# Patient Record
Sex: FEMALE | Race: WHITE
[De-identification: names, ages, dates, MRNs, and addresses within clinical notes are randomized per-mention and may not be internally consistent; named-entity substitution may affect disease eponyms.]

---

## 2017-03-19 ENCOUNTER — HOSPITAL ENCOUNTER (EMERGENCY)
Dept: HOSPITAL 84 - D.ER | Age: 77
Discharge: HOME | End: 2017-03-19
Payer: MEDICARE

## 2017-03-19 DIAGNOSIS — I10: ICD-10-CM

## 2017-03-19 DIAGNOSIS — F17.200: ICD-10-CM

## 2017-03-19 DIAGNOSIS — J20.9: Primary | ICD-10-CM

## 2017-03-19 LAB
ALBUMIN SERPL-MCNC: 3.9 G/DL (ref 3.4–5)
ALP SERPL-CCNC: 59 U/L (ref 46–116)
ALT SERPL-CCNC: 15 U/L (ref 10–68)
ANION GAP SERPL CALC-SCNC: 13.6 MMOL/L (ref 8–16)
BASOPHILS NFR BLD AUTO: 0.4 % (ref 0–2)
BILIRUB SERPL-MCNC: 0.33 MG/DL (ref 0.2–1.3)
BUN SERPL-MCNC: 20 MG/DL (ref 7–18)
CALCIUM SERPL-MCNC: 9 MG/DL (ref 8.5–10.1)
CHLORIDE SERPL-SCNC: 104 MMOL/L (ref 98–107)
CHOLEST/HDLC SERPL: 3.3 RATIO (ref 2.3–4.1)
CK MB SERPL-MCNC: 1.2 U/L (ref 0–3.6)
CK SERPL-CCNC: 64 UL (ref 21–215)
CO2 SERPL-SCNC: 26.9 MMOL/L (ref 21–32)
CREAT SERPL-MCNC: 0.9 MG/DL (ref 0.6–1.3)
EOSINOPHIL NFR BLD: 2.8 % (ref 0–7)
ERYTHROCYTE [DISTWIDTH] IN BLOOD BY AUTOMATED COUNT: 14.2 % (ref 11.5–14.5)
GLOBULIN SER-MCNC: 3.3 G/L
GLUCOSE SERPL-MCNC: 105 MG/DL (ref 74–106)
HCT VFR BLD CALC: 38.1 % (ref 36–48)
HDLC SERPL-MCNC: 63 MG/DL (ref 32–96)
HGB BLD-MCNC: 13.3 G/DL (ref 12–16)
IMM GRANULOCYTES NFR BLD: 0.3 % (ref 0–5)
LDL-HDL RATIO: 1.5 RATIO (ref 1.5–3.5)
LDLC SERPL-MCNC: 95 MG/DL (ref 0–100)
LYMPHOCYTES NFR BLD AUTO: 28.5 % (ref 15–50)
MCH RBC QN AUTO: 31 PG (ref 26–34)
MCHC RBC AUTO-ENTMCNC: 34.9 G/DL (ref 31–37)
MCV RBC: 88.8 FL (ref 80–100)
MONOCYTES NFR BLD: 12.4 % (ref 2–11)
NEUTROPHILS NFR BLD AUTO: 55.6 % (ref 40–80)
OSMOLALITY SERPL CALC.SUM OF ELEC: 283 MOSM/KG (ref 275–300)
PLATELET # BLD: 258 10X3/UL (ref 130–400)
PMV BLD AUTO: 10 FL (ref 7.4–10.4)
POTASSIUM SERPL-SCNC: 3.5 MMOL/L (ref 3.5–5.1)
PROT SERPL-MCNC: 7.2 G/DL (ref 6.4–8.2)
RBC # BLD AUTO: 4.29 10X6/UL (ref 4–5.4)
SODIUM SERPL-SCNC: 141 MMOL/L (ref 136–145)
TRIGL SERPL-MCNC: 240 MG/DL (ref 30–200)
TROPONIN I SERPL-MCNC: < 0.017 NG/ML (ref 0–0.06)
WBC # BLD AUTO: 7.2 10X3/UL (ref 4.8–10.8)

## 2017-03-28 ENCOUNTER — HOSPITAL ENCOUNTER (INPATIENT)
Dept: HOSPITAL 84 - D.M2 | Age: 77
LOS: 5 days | Discharge: HOME | DRG: 372 | End: 2017-04-02
Attending: FAMILY MEDICINE | Admitting: FAMILY MEDICINE
Payer: MEDICARE

## 2017-03-28 VITALS
WEIGHT: 109.63 LBS | BODY MASS INDEX: 21.52 KG/M2 | HEIGHT: 60 IN | BODY MASS INDEX: 21.52 KG/M2 | WEIGHT: 109.63 LBS | BODY MASS INDEX: 21.52 KG/M2 | HEIGHT: 60 IN

## 2017-03-28 VITALS — DIASTOLIC BLOOD PRESSURE: 81 MMHG | SYSTOLIC BLOOD PRESSURE: 155 MMHG

## 2017-03-28 VITALS — DIASTOLIC BLOOD PRESSURE: 85 MMHG | SYSTOLIC BLOOD PRESSURE: 139 MMHG

## 2017-03-28 DIAGNOSIS — N39.0: ICD-10-CM

## 2017-03-28 DIAGNOSIS — E86.0: ICD-10-CM

## 2017-03-28 DIAGNOSIS — K58.9: ICD-10-CM

## 2017-03-28 DIAGNOSIS — A04.7: Primary | ICD-10-CM

## 2017-03-28 LAB
ALBUMIN SERPL-MCNC: 4 G/DL (ref 3.4–5)
ALP SERPL-CCNC: 60 U/L (ref 46–116)
ALT SERPL-CCNC: 14 U/L (ref 10–68)
ANION GAP SERPL CALC-SCNC: 17.1 MMOL/L (ref 8–16)
BASOPHILS NFR BLD AUTO: 0.2 % (ref 0–2)
BILIRUB SERPL-MCNC: 0.23 MG/DL (ref 0.2–1.3)
BUN SERPL-MCNC: 19 MG/DL (ref 7–18)
CALCIUM SERPL-MCNC: 9.2 MG/DL (ref 8.5–10.1)
CHLORIDE SERPL-SCNC: 103 MMOL/L (ref 98–107)
CO2 SERPL-SCNC: 24.8 MMOL/L (ref 21–32)
CREAT SERPL-MCNC: 1.1 MG/DL (ref 0.6–1.3)
EOSINOPHIL NFR BLD: 1.7 % (ref 0–7)
ERYTHROCYTE [DISTWIDTH] IN BLOOD BY AUTOMATED COUNT: 13.5 % (ref 11.5–14.5)
GLOBULIN SER-MCNC: 2.8 G/L
GLUCOSE SERPL-MCNC: 102 MG/DL (ref 74–106)
HCT VFR BLD CALC: 37 % (ref 36–48)
HGB BLD-MCNC: 12.1 G/DL (ref 12–16)
IMM GRANULOCYTES NFR BLD: 0.2 % (ref 0–5)
LYMPHOCYTES NFR BLD AUTO: 20.8 % (ref 15–50)
MCH RBC QN AUTO: 29.4 PG (ref 26–34)
MCHC RBC AUTO-ENTMCNC: 32.7 G/DL (ref 31–37)
MCV RBC: 90 FL (ref 80–100)
MONOCYTES NFR BLD: 11.9 % (ref 2–11)
NEUTROPHILS NFR BLD AUTO: 65.2 % (ref 40–80)
OSMOLALITY SERPL CALC.SUM OF ELEC: 282 MOSM/KG (ref 275–300)
PLATELET # BLD: 244 10X3/UL (ref 130–400)
PMV BLD AUTO: 10.5 FL (ref 7.4–10.4)
POTASSIUM SERPL-SCNC: 3.9 MMOL/L (ref 3.5–5.1)
PROT SERPL-MCNC: 6.8 G/DL (ref 6.4–8.2)
RBC # BLD AUTO: 4.11 10X6/UL (ref 4–5.4)
SODIUM SERPL-SCNC: 141 MMOL/L (ref 136–145)
WBC # BLD AUTO: 10 10X3/UL (ref 4.8–10.8)

## 2017-03-28 NOTE — NUR
RECEIVED PATIENT TO ROOM 2105 VIA WHEELCHAIR FROM ADMISSIONS. PATIENT IS A
DIRECT ADMIT FROM 'S OFFICE WITH COMPLAINTS OF BLADDER INFECTION.
ALERT/ORIENTED. SITTING TO SIDE OF BED AT THIS TIME. NO IV ACCESS. RESP EVEN
AND UNLABORED. NO DISTRESS.

## 2017-03-28 NOTE — NUR
ORDERED ROCEPHIN HELD R/T LISTED ALLERGY TO CEFDINIR. PT CANNOT RECALL THAT
SHE IS ALLERGIC TO CEFDINIR OR WHAT HAPPENS WHEN SHE TAKES IT. CONTINUE TO
MONITOR CLOSELY.

## 2017-03-28 NOTE — NUR
AWAITING BLOOD CULTURES TO BE DRAWN, THEN WILL START THE FLAGYL. PT DID TAKE
PRN TYLENOL AND PYRIDIUM. CONTINUE TO MONITOR CLOSELY.

## 2017-03-28 NOTE — NUR
22 GAUGE IV PLACED X 1 STICK TO LEFT FOREARM BY THIS WRITER. GOOD BLOOD
RETURN, EASY FLUSH. ABLE TO PULL NEEDED BLOOD FROM IV FOR PHELBOTOMIST FOR
STAT LABS. TAPED, DATED AND SECURED. PATIENT TOLERATED IV PLACEMENT WELL. NO
DISTRESS. XRAY AT BEDSIDE FOR STAT RADIOGRAPHS AT THIS TIME.

## 2017-03-28 NOTE — NUR
PT AWAKE, ALERT, ORIENTED, REQUESTING PAIN MEDICATION FOR BACK PAIN AND LOWER
ABDOMINAL PAIN. CURRENTLY THERE ARE NO MEDICATION ORDERS. WILL CONTACT ON CALL
PHYSICIAN. WILL ADMINISTER IV ROCEPHIN AS ORDERED. CONTINUE TO MONITOR
CLOSELY.

## 2017-03-28 NOTE — NUR
DR. VIGIL ON CALL DID AUTHORIZE NEWS MEDS AND LAB ORDERS. ORDERS ENTERED.
CONTINUE TO MONITOR PT CLOSELY.

## 2017-03-29 VITALS — DIASTOLIC BLOOD PRESSURE: 68 MMHG | SYSTOLIC BLOOD PRESSURE: 119 MMHG

## 2017-03-29 VITALS — DIASTOLIC BLOOD PRESSURE: 80 MMHG | SYSTOLIC BLOOD PRESSURE: 166 MMHG

## 2017-03-29 VITALS — DIASTOLIC BLOOD PRESSURE: 71 MMHG | SYSTOLIC BLOOD PRESSURE: 145 MMHG

## 2017-03-29 VITALS — DIASTOLIC BLOOD PRESSURE: 76 MMHG | SYSTOLIC BLOOD PRESSURE: 115 MMHG

## 2017-03-29 VITALS — SYSTOLIC BLOOD PRESSURE: 140 MMHG | DIASTOLIC BLOOD PRESSURE: 80 MMHG

## 2017-03-29 VITALS — DIASTOLIC BLOOD PRESSURE: 81 MMHG | SYSTOLIC BLOOD PRESSURE: 136 MMHG

## 2017-03-29 LAB
ALBUMIN SERPL-MCNC: 3.4 G/DL (ref 3.4–5)
ALP SERPL-CCNC: 54 U/L (ref 46–116)
ALT SERPL-CCNC: 14 U/L (ref 10–68)
ANION GAP SERPL CALC-SCNC: 12.5 MMOL/L (ref 8–16)
APPEARANCE UR: CLEAR
BACTERIA #/AREA URNS HPF: (no result) /HPF
BASOPHILS NFR BLD AUTO: 0.2 % (ref 0–2)
BILIRUB SERPL-MCNC: 0.34 MG/DL (ref 0.2–1.3)
BUN SERPL-MCNC: 15 MG/DL (ref 7–18)
CALCIUM SERPL-MCNC: 8.3 MG/DL (ref 8.5–10.1)
CHLORIDE SERPL-SCNC: 106 MMOL/L (ref 98–107)
CO2 SERPL-SCNC: 25 MMOL/L (ref 21–32)
COLOR UR: YELLOW
CREAT SERPL-MCNC: 1.1 MG/DL (ref 0.6–1.3)
EOSINOPHIL NFR BLD: 2.1 % (ref 0–7)
ERYTHROCYTE [DISTWIDTH] IN BLOOD BY AUTOMATED COUNT: 13.1 % (ref 11.5–14.5)
GLOBULIN SER-MCNC: 2.8 G/L
GLUCOSE SERPL-MCNC: 98 MG/DL (ref 74–106)
HCT VFR BLD CALC: 35.1 % (ref 36–48)
HGB BLD-MCNC: 11.5 G/DL (ref 12–16)
IMM GRANULOCYTES NFR BLD: 0.3 % (ref 0–5)
LYMPHOCYTES NFR BLD AUTO: 25.1 % (ref 15–50)
MCH RBC QN AUTO: 29.2 PG (ref 26–34)
MCHC RBC AUTO-ENTMCNC: 32.8 G/DL (ref 31–37)
MCV RBC: 89.1 FL (ref 80–100)
MONOCYTES NFR BLD: 12.5 % (ref 2–11)
NEUTROPHILS NFR BLD AUTO: 59.8 % (ref 40–80)
OSMOLALITY SERPL CALC.SUM OF ELEC: 279 MOSM/KG (ref 275–300)
PH UR STRIP: 6 [PH] (ref 5–6)
PLATELET # BLD: 241 10X3/UL (ref 130–400)
PMV BLD AUTO: 10.3 FL (ref 7.4–10.4)
POTASSIUM SERPL-SCNC: 3.5 MMOL/L (ref 3.5–5.1)
PROT SERPL-MCNC: 6.2 G/DL (ref 6.4–8.2)
RBC # BLD AUTO: 3.94 10X6/UL (ref 4–5.4)
RBC #/AREA URNS HPF: (no result) /HPF (ref 0–5)
SODIUM SERPL-SCNC: 140 MMOL/L (ref 136–145)
SP GR UR STRIP: 1.01 (ref 1–1.02)
SQUAMOUS #/AREA URNS HPF: (no result) /HPF (ref 0–5)
WBC # BLD AUTO: 10.1 10X3/UL (ref 4.8–10.8)
WBC #/AREA URNS HPF: (no result) /HPF (ref 0–5)

## 2017-03-29 NOTE — NUR
PT WAS RECEIVED AT THE BEGINNING OF THIS SHIFT AWAKE AND ORIENTED X 3.  NO
VOICED COMPLAINTS OF THIS TIME.  VITAL SIGNS WNL.  LEFT FOREARM WITH NS GOING
PER PUMP AT 75ML'S/HR.  PT IS UP AD KATELIN.  WILL BE MONITORING PT THROUGHOUT
THIS SHIFT AND ASSISTING PRN WITH ADL'S.  CALL LIGHT IS IN REACH.

## 2017-03-29 NOTE — NUR
THIS PT. GOT TYLENOL 650MG AT 2PM FOR ABDOMINAL PAIN AND THEN AN ORDER FOR
HYDROCODONE 5MG WAS GIVEN AT 2:30PM PER DR. WOLF FOR A ONE TIME DOSE.
MONITORING HER FOR ANY ADVERSE REACTIONS.  CALL LIGHT IS IN REACH.

## 2017-03-29 NOTE — NUR
PT VOMITED, GIVEN PRN ZOFRAN. PT DENIES ANY OTHER NEEDS. PT IS IN NO ACUTE
DISTRESS. CONTINUE TO MONITOR CLOSELY.

## 2017-03-30 VITALS — DIASTOLIC BLOOD PRESSURE: 67 MMHG | SYSTOLIC BLOOD PRESSURE: 137 MMHG

## 2017-03-30 VITALS — DIASTOLIC BLOOD PRESSURE: 70 MMHG | SYSTOLIC BLOOD PRESSURE: 133 MMHG

## 2017-03-30 VITALS — DIASTOLIC BLOOD PRESSURE: 68 MMHG | SYSTOLIC BLOOD PRESSURE: 149 MMHG

## 2017-03-30 VITALS — SYSTOLIC BLOOD PRESSURE: 148 MMHG | DIASTOLIC BLOOD PRESSURE: 66 MMHG

## 2017-03-30 VITALS — DIASTOLIC BLOOD PRESSURE: 57 MMHG | SYSTOLIC BLOOD PRESSURE: 103 MMHG

## 2017-03-30 VITALS — DIASTOLIC BLOOD PRESSURE: 69 MMHG | SYSTOLIC BLOOD PRESSURE: 142 MMHG

## 2017-03-30 NOTE — NUR
RESUMED CARE OF PT, PL-ONM-GV-75, PT IS CONTACT ISO-C-DIFF, FAMILY IN ROOM,
DENIES ANY NEEDS, BED IS LOW, SRX2, CALL LIGHT IN REACH, WILL CONTINUE TO
MONITOR

## 2017-03-31 VITALS — SYSTOLIC BLOOD PRESSURE: 137 MMHG | DIASTOLIC BLOOD PRESSURE: 77 MMHG

## 2017-03-31 VITALS — SYSTOLIC BLOOD PRESSURE: 124 MMHG | DIASTOLIC BLOOD PRESSURE: 63 MMHG

## 2017-03-31 VITALS — SYSTOLIC BLOOD PRESSURE: 146 MMHG | DIASTOLIC BLOOD PRESSURE: 67 MMHG

## 2017-03-31 VITALS — DIASTOLIC BLOOD PRESSURE: 67 MMHG | SYSTOLIC BLOOD PRESSURE: 140 MMHG

## 2017-03-31 VITALS — SYSTOLIC BLOOD PRESSURE: 122 MMHG | DIASTOLIC BLOOD PRESSURE: 63 MMHG

## 2017-03-31 LAB
ALBUMIN SERPL-MCNC: 3.4 G/DL (ref 3.4–5)
ALP SERPL-CCNC: 49 U/L (ref 46–116)
ALT SERPL-CCNC: 18 U/L (ref 10–68)
ANION GAP SERPL CALC-SCNC: 12.7 MMOL/L (ref 8–16)
BASOPHILS NFR BLD AUTO: 0.4 % (ref 0–2)
BILIRUB SERPL-MCNC: 0.33 MG/DL (ref 0.2–1.3)
BUN SERPL-MCNC: 7 MG/DL (ref 7–18)
CALCIUM SERPL-MCNC: 7.9 MG/DL (ref 8.5–10.1)
CHLORIDE SERPL-SCNC: 107 MMOL/L (ref 98–107)
CO2 SERPL-SCNC: 25.3 MMOL/L (ref 21–32)
CREAT SERPL-MCNC: 0.8 MG/DL (ref 0.6–1.3)
EOSINOPHIL NFR BLD: 2.1 % (ref 0–7)
ERYTHROCYTE [DISTWIDTH] IN BLOOD BY AUTOMATED COUNT: 13.2 % (ref 11.5–14.5)
GLOBULIN SER-MCNC: 3 G/L
GLUCOSE SERPL-MCNC: 143 MG/DL (ref 74–106)
HCT VFR BLD CALC: 32.9 % (ref 36–48)
HGB BLD-MCNC: 10.7 G/DL (ref 12–16)
IMM GRANULOCYTES NFR BLD: 0.1 % (ref 0–5)
LYMPHOCYTES NFR BLD AUTO: 24.4 % (ref 15–50)
MCH RBC QN AUTO: 29.2 PG (ref 26–34)
MCHC RBC AUTO-ENTMCNC: 32.5 G/DL (ref 31–37)
MCV RBC: 89.6 FL (ref 80–100)
MONOCYTES NFR BLD: 11.4 % (ref 2–11)
NEUTROPHILS NFR BLD AUTO: 61.6 % (ref 40–80)
OSMOLALITY SERPL CALC.SUM OF ELEC: 282 MOSM/KG (ref 275–300)
PLATELET # BLD: 222 10X3/UL (ref 130–400)
PMV BLD AUTO: 10.4 FL (ref 7.4–10.4)
POTASSIUM SERPL-SCNC: 3 MMOL/L (ref 3.5–5.1)
PROT SERPL-MCNC: 6.4 G/DL (ref 6.4–8.2)
RBC # BLD AUTO: 3.67 10X6/UL (ref 4–5.4)
SODIUM SERPL-SCNC: 142 MMOL/L (ref 136–145)
WBC # BLD AUTO: 7.7 10X3/UL (ref 4.8–10.8)

## 2017-03-31 NOTE — NUR
Patient Name: LIZA MEDEL Admission Status: Elective
Accout number: [id number] Admission Date: 2017
: 1940 Admission Diagnosis:URINARY TRACT INFECTION, SITE NOT
SPECIFIED
Attending: HEATHER Current LOS: 3
 
Anticipated DC Date:
Planned Disposition: Home
Primary Insurance: MEDICARE A & B
 
 
Discharge Planning Comments:
* Is the patient Alert and Oriented? Yes 0
* How many steps to enter\exit or inside your home? 1 0
* PCP DR. WOLF 0
* Pharmacy [facility] 0
* Preadmission Environment Home with Family 0
* ADLs Independent 0
* Equipment Nebulizer 0
* Other Equipment [facility] - MEDICAL EQUIPMENT PROVIDER 0
* List name and contact numbers for known caregivers / representatives who
currently or will assist patient after discharge: DEBBIE MEDEL, DAUGHTER,
[phone] 0
* Community resources currently utilized None 0
* Please name any agencies selected above. NONE 0
* Additional services required to return to the preadmission environment? No 0
* Can the patient safely return to the preadmission environment? Yes 0
* Has this patient been hospitalized within the prior 30 days at any hospital?
No 0
 
CM MET WITH PT IN ROOM TO DISCUSS DISCHARGE PLANNING AND NEEDS. PT REPORTS
LIVING AT HOME INDEPENDENTLY WITH HER SPOUSE AND ADULT DAUGHTER. PT HAS A
NEBULIZER PROVIDED BY [employer] (SHE IS NOT FOR SURE). PT HAS NO
OUTSIDE SERVICES ASSISTING IN THE HOME. CM DISCUSSED AVAILABILITY OF HOME
HEALTH, REHAB SERVICES AND MEDICAL EQUIPMENT. PT DENIES DISCHARGE NEEDS,
REPORTS HER DAUGHTER WILL PICK HER UP FOR DISCHARGE HOME. IMPORTANT MESSAGE
FROM MEDICARE PROVIDED AND EXPLAINED.
 
PT PLANS TO DISCHARGE HOME WITH FAMILY, NO ANTICIPATED DISCHARGE NEEDS. CM TO
FOLLOW AND ASSIST AS NEEDED.
 
: Manish Jackson

## 2017-04-01 VITALS — SYSTOLIC BLOOD PRESSURE: 104 MMHG | DIASTOLIC BLOOD PRESSURE: 66 MMHG

## 2017-04-01 VITALS — SYSTOLIC BLOOD PRESSURE: 124 MMHG | DIASTOLIC BLOOD PRESSURE: 60 MMHG

## 2017-04-01 VITALS — DIASTOLIC BLOOD PRESSURE: 98 MMHG | SYSTOLIC BLOOD PRESSURE: 131 MMHG

## 2017-04-01 VITALS — DIASTOLIC BLOOD PRESSURE: 72 MMHG | SYSTOLIC BLOOD PRESSURE: 145 MMHG

## 2017-04-01 VITALS — DIASTOLIC BLOOD PRESSURE: 51 MMHG | SYSTOLIC BLOOD PRESSURE: 102 MMHG

## 2017-04-01 VITALS — DIASTOLIC BLOOD PRESSURE: 55 MMHG | SYSTOLIC BLOOD PRESSURE: 127 MMHG

## 2017-04-01 LAB
ALBUMIN SERPL-MCNC: 3 G/DL (ref 3.4–5)
ALP SERPL-CCNC: 46 U/L (ref 46–116)
ALT SERPL-CCNC: 28 U/L (ref 10–68)
ANION GAP SERPL CALC-SCNC: 12.5 MMOL/L (ref 8–16)
BASOPHILS NFR BLD AUTO: 0.3 % (ref 0–2)
BILIRUB SERPL-MCNC: 0.2 MG/DL (ref 0.2–1.3)
BUN SERPL-MCNC: 8 MG/DL (ref 7–18)
CALCIUM SERPL-MCNC: 8 MG/DL (ref 8.5–10.1)
CHLORIDE SERPL-SCNC: 109 MMOL/L (ref 98–107)
CO2 SERPL-SCNC: 23.7 MMOL/L (ref 21–32)
CREAT SERPL-MCNC: 0.7 MG/DL (ref 0.6–1.3)
EOSINOPHIL NFR BLD: 3.5 % (ref 0–7)
ERYTHROCYTE [DISTWIDTH] IN BLOOD BY AUTOMATED COUNT: 13 % (ref 11.5–14.5)
GLOBULIN SER-MCNC: 2.9 G/L
GLUCOSE SERPL-MCNC: 95 MG/DL (ref 74–106)
HCT VFR BLD CALC: 32 % (ref 36–48)
HGB BLD-MCNC: 10.3 G/DL (ref 12–16)
IMM GRANULOCYTES NFR BLD: 0.1 % (ref 0–5)
LYMPHOCYTES NFR BLD AUTO: 24.3 % (ref 15–50)
MCH RBC QN AUTO: 28.7 PG (ref 26–34)
MCHC RBC AUTO-ENTMCNC: 32.2 G/DL (ref 31–37)
MCV RBC: 89.1 FL (ref 80–100)
MONOCYTES NFR BLD: 13.6 % (ref 2–11)
NEUTROPHILS NFR BLD AUTO: 58.2 % (ref 40–80)
OSMOLALITY SERPL CALC.SUM OF ELEC: 280 MOSM/KG (ref 275–300)
PLATELET # BLD: 211 10X3/UL (ref 130–400)
PMV BLD AUTO: 10.7 FL (ref 7.4–10.4)
POTASSIUM SERPL-SCNC: 3.2 MMOL/L (ref 3.5–5.1)
PROT SERPL-MCNC: 5.9 G/DL (ref 6.4–8.2)
RBC # BLD AUTO: 3.59 10X6/UL (ref 4–5.4)
SODIUM SERPL-SCNC: 142 MMOL/L (ref 136–145)
WBC # BLD AUTO: 7.4 10X3/UL (ref 4.8–10.8)

## 2017-04-01 NOTE — NUR
PT CALLED HER IV WAS LEAKING. LOOKED AT IV AND WAS INFILTRATED. SO REMOVED IV,
CATH INTACT. ATTEMPTED TO PUT IV IN RIGHT FOREARM BUT WAS UNSUCCESSFUL. TOLD
HER WILL SEND SOMEONE ELSE IN.

## 2017-04-01 NOTE — NUR
PT IN BED RESTING WITH EYES CLOSED. REMAINS ON CONTACT ISOLATION. IV TO LEFT
FA WITH NS AT 75CC/HR. NO DISTRESS NOTED. SR UP X 2. C/L IN REACH. WILL
MONITOR.

## 2017-04-01 NOTE — NUR
PT FAMILY MEMBER CAME TO DESK FOUND ANTS IN ROOM ALL ALONG WINDOW SEAL. CALLED
SUPERVISIOR AND TOLD HER. GOING TO MOVE PT TO ROOM 2109 SINCE UNSURE WHEN THEY
WILL COME TO SPRAY FOR THE ANTS.

## 2017-04-02 VITALS — DIASTOLIC BLOOD PRESSURE: 67 MMHG | SYSTOLIC BLOOD PRESSURE: 140 MMHG

## 2017-04-02 VITALS — DIASTOLIC BLOOD PRESSURE: 79 MMHG | SYSTOLIC BLOOD PRESSURE: 173 MMHG

## 2017-04-02 VITALS — DIASTOLIC BLOOD PRESSURE: 81 MMHG | SYSTOLIC BLOOD PRESSURE: 158 MMHG

## 2017-04-02 VITALS — SYSTOLIC BLOOD PRESSURE: 149 MMHG | DIASTOLIC BLOOD PRESSURE: 73 MMHG

## 2017-04-02 LAB
ANION GAP SERPL CALC-SCNC: 14.2 MMOL/L (ref 8–16)
BASOPHILS NFR BLD AUTO: 0.2 % (ref 0–2)
BUN SERPL-MCNC: 6 MG/DL (ref 7–18)
CALCIUM SERPL-MCNC: 8.3 MG/DL (ref 8.5–10.1)
CHLORIDE SERPL-SCNC: 110 MMOL/L (ref 98–107)
CO2 SERPL-SCNC: 23.2 MMOL/L (ref 21–32)
CREAT SERPL-MCNC: 0.7 MG/DL (ref 0.6–1.3)
EOSINOPHIL NFR BLD: 3.2 % (ref 0–7)
ERYTHROCYTE [DISTWIDTH] IN BLOOD BY AUTOMATED COUNT: 13.2 % (ref 11.5–14.5)
GLUCOSE SERPL-MCNC: 98 MG/DL (ref 74–106)
HCT VFR BLD CALC: 34.2 % (ref 36–48)
HGB BLD-MCNC: 11.1 G/DL (ref 12–16)
IMM GRANULOCYTES NFR BLD: 0.1 % (ref 0–5)
LYMPHOCYTES NFR BLD AUTO: 26.7 % (ref 15–50)
MCH RBC QN AUTO: 28.9 PG (ref 26–34)
MCHC RBC AUTO-ENTMCNC: 32.5 G/DL (ref 31–37)
MCV RBC: 89.1 FL (ref 80–100)
MONOCYTES NFR BLD: 13.2 % (ref 2–11)
NEUTROPHILS NFR BLD AUTO: 56.6 % (ref 40–80)
OSMOLALITY SERPL CALC.SUM OF ELEC: 284 MOSM/KG (ref 275–300)
PLATELET # BLD: 227 10X3/UL (ref 130–400)
PMV BLD AUTO: 10.3 FL (ref 7.4–10.4)
POTASSIUM SERPL-SCNC: 3.4 MMOL/L (ref 3.5–5.1)
RBC # BLD AUTO: 3.84 10X6/UL (ref 4–5.4)
SODIUM SERPL-SCNC: 144 MMOL/L (ref 136–145)
WBC # BLD AUTO: 8.8 10X3/UL (ref 4.8–10.8)

## 2017-05-26 ENCOUNTER — HOSPITAL ENCOUNTER (OUTPATIENT)
Dept: HOSPITAL 84 - D.LAB | Age: 77
Discharge: HOME | End: 2017-05-26
Attending: INTERNAL MEDICINE
Payer: MEDICARE

## 2017-05-26 VITALS — BODY MASS INDEX: 20.5 KG/M2

## 2017-05-26 DIAGNOSIS — R63.4: ICD-10-CM

## 2017-05-26 DIAGNOSIS — K59.00: Primary | ICD-10-CM

## 2017-05-26 LAB
ALBUMIN SERPL-MCNC: 4.1 G/DL (ref 3.4–5)
ALP SERPL-CCNC: 78 U/L (ref 46–116)
ALT SERPL-CCNC: 20 U/L (ref 10–68)
ANION GAP SERPL CALC-SCNC: 14.1 MMOL/L (ref 8–16)
BASOPHILS NFR BLD AUTO: 0.2 % (ref 0–2)
BILIRUB SERPL-MCNC: 0.24 MG/DL (ref 0.2–1.3)
BUN SERPL-MCNC: 12 MG/DL (ref 7–18)
CALCIUM SERPL-MCNC: 9.3 MG/DL (ref 8.5–10.1)
CHLORIDE SERPL-SCNC: 101 MMOL/L (ref 98–107)
CO2 SERPL-SCNC: 27 MMOL/L (ref 21–32)
CREAT SERPL-MCNC: 0.8 MG/DL (ref 0.6–1.3)
EOSINOPHIL NFR BLD: 2 % (ref 0–7)
ERYTHROCYTE [DISTWIDTH] IN BLOOD BY AUTOMATED COUNT: 13.2 % (ref 11.5–14.5)
GLOBULIN SER-MCNC: 3.2 G/L
GLUCOSE SERPL-MCNC: 96 MG/DL (ref 74–106)
HCT VFR BLD CALC: 42.2 % (ref 36–48)
HGB BLD-MCNC: 13.8 G/DL (ref 12–16)
IMM GRANULOCYTES NFR BLD: 0.2 % (ref 0–5)
LYMPHOCYTES NFR BLD AUTO: 29 % (ref 15–50)
MCH RBC QN AUTO: 29.9 PG (ref 26–34)
MCHC RBC AUTO-ENTMCNC: 32.7 G/DL (ref 31–37)
MCV RBC: 91.3 FL (ref 80–100)
MONOCYTES NFR BLD: 10.2 % (ref 2–11)
NEUTROPHILS NFR BLD AUTO: 58.4 % (ref 40–80)
OSMOLALITY SERPL CALC.SUM OF ELEC: 275 MOSM/KG (ref 275–300)
PLATELET # BLD: 319 10X3/UL (ref 130–400)
PMV BLD AUTO: 9.9 FL (ref 7.4–10.4)
POTASSIUM SERPL-SCNC: 4.1 MMOL/L (ref 3.5–5.1)
PROT SERPL-MCNC: 7.3 G/DL (ref 6.4–8.2)
RBC # BLD AUTO: 4.62 10X6/UL (ref 4–5.4)
SODIUM SERPL-SCNC: 138 MMOL/L (ref 136–145)
WBC # BLD AUTO: 9.4 10X3/UL (ref 4.8–10.8)

## 2017-08-11 ENCOUNTER — HOSPITAL ENCOUNTER (OUTPATIENT)
Dept: HOSPITAL 84 - D.RAD | Age: 77
Discharge: HOME | End: 2017-08-11
Attending: FAMILY MEDICINE
Payer: MEDICARE

## 2017-08-11 VITALS — BODY MASS INDEX: 20.5 KG/M2

## 2017-08-11 DIAGNOSIS — J44.9: Primary | ICD-10-CM

## 2018-06-01 ENCOUNTER — HOSPITAL ENCOUNTER (EMERGENCY)
Dept: HOSPITAL 84 - D.ER | Age: 78
Discharge: HOME | End: 2018-06-01
Payer: MEDICARE

## 2018-06-01 VITALS — BODY MASS INDEX: 20.5 KG/M2

## 2018-06-01 DIAGNOSIS — I10: ICD-10-CM

## 2018-06-01 DIAGNOSIS — R55: Primary | ICD-10-CM

## 2018-06-01 DIAGNOSIS — R19.7: ICD-10-CM

## 2018-06-01 DIAGNOSIS — R10.9: ICD-10-CM

## 2018-06-01 DIAGNOSIS — Z87.19: ICD-10-CM

## 2018-06-01 LAB
ALBUMIN SERPL-MCNC: 3.6 G/DL (ref 3.4–5)
ALP SERPL-CCNC: 62 U/L (ref 46–116)
ALT SERPL-CCNC: 15 U/L (ref 10–68)
ANION GAP SERPL CALC-SCNC: 15.4 MMOL/L (ref 8–16)
APPEARANCE UR: CLEAR
BACTERIA #/AREA URNS HPF: (no result) /HPF
BASOPHILS NFR BLD AUTO: 0.2 % (ref 0–2)
BILIRUB SERPL-MCNC: 0.31 MG/DL (ref 0.2–1.3)
BILIRUB SERPL-MCNC: NEGATIVE MG/DL
BUN SERPL-MCNC: 13 MG/DL (ref 7–18)
CALCIUM SERPL-MCNC: 9 MG/DL (ref 8.5–10.1)
CHLORIDE SERPL-SCNC: 100 MMOL/L (ref 98–107)
CK SERPL-CCNC: 57 UL (ref 21–215)
CO2 SERPL-SCNC: 23.4 MMOL/L (ref 21–32)
COLOR UR: YELLOW
CREAT SERPL-MCNC: 0.7 MG/DL (ref 0.6–1.3)
EOSINOPHIL NFR BLD: 1.7 % (ref 0–7)
ERYTHROCYTE [DISTWIDTH] IN BLOOD BY AUTOMATED COUNT: 13.1 % (ref 11.5–14.5)
GLOBULIN SER-MCNC: 3.4 G/L
GLUCOSE SERPL-MCNC: 99 MG/DL (ref 74–106)
GLUCOSE SERPL-MCNC: NEGATIVE MG/DL
HCT VFR BLD CALC: 43.4 % (ref 36–48)
HGB BLD-MCNC: 15 G/DL (ref 12–16)
IMM GRANULOCYTES NFR BLD: 0.3 % (ref 0–5)
INR PPP: 0.89 (ref 0.85–1.17)
KETONES UR STRIP-MCNC: NEGATIVE MG/DL
LYMPHOCYTES NFR BLD AUTO: 26 % (ref 15–50)
MAGNESIUM SERPL-MCNC: 1.9 MG/DL (ref 1.8–2.4)
MCH RBC QN AUTO: 30 PG (ref 26–34)
MCHC RBC AUTO-ENTMCNC: 34.6 G/DL (ref 31–37)
MCV RBC: 86.8 FL (ref 80–100)
MONOCYTES NFR BLD: 10.7 % (ref 2–11)
NEUTROPHILS NFR BLD AUTO: 61.1 % (ref 40–80)
NITRITE UR-MCNC: NEGATIVE MG/ML
NT-PROBNP SERPL-MCNC: 141 PG/ML (ref 0–450)
OSMOLALITY SERPL CALC.SUM OF ELEC: 269 MOSM/KG (ref 275–300)
PH UR STRIP: 6 [PH] (ref 5–6)
PLATELET # BLD: 286 10X3/UL (ref 130–400)
PMV BLD AUTO: 9.9 FL (ref 7.4–10.4)
POTASSIUM SERPL-SCNC: 3.8 MMOL/L (ref 3.5–5.1)
PROT SERPL-MCNC: 7 G/DL (ref 6.4–8.2)
PROT UR-MCNC: NEGATIVE MG/DL
PROTHROMBIN TIME: 11.7 SECONDS (ref 11.6–15)
RBC # BLD AUTO: 5 10X6/UL (ref 4–5.4)
RBC #/AREA URNS HPF: (no result) /HPF (ref 0–5)
SODIUM SERPL-SCNC: 135 MMOL/L (ref 136–145)
SP GR UR STRIP: 1.01 (ref 1–1.02)
SQUAMOUS #/AREA URNS HPF: (no result) /HPF (ref 0–5)
TROPONIN I SERPL-MCNC: 0.02 NG/ML (ref 0–0.06)
UROBILINOGEN UR-MCNC: NORMAL MG/DL
WBC # BLD AUTO: 12.4 10X3/UL (ref 4.8–10.8)
WBC #/AREA URNS HPF: (no result) /HPF (ref 0–5)

## 2018-06-12 ENCOUNTER — HOSPITAL ENCOUNTER (OUTPATIENT)
Dept: HOSPITAL 84 - D.MRI | Age: 78
Discharge: HOME | End: 2018-06-12
Attending: FAMILY MEDICINE
Payer: MEDICARE

## 2018-06-12 VITALS — BODY MASS INDEX: 20.5 KG/M2

## 2018-06-12 DIAGNOSIS — R10.31: Primary | ICD-10-CM

## 2018-07-06 ENCOUNTER — HOSPITAL ENCOUNTER (OUTPATIENT)
Dept: HOSPITAL 84 - D.SP | Age: 78
Discharge: HOME | End: 2018-07-06
Attending: ORTHOPAEDIC SURGERY
Payer: MEDICARE

## 2018-07-06 VITALS — BODY MASS INDEX: 20.5 KG/M2

## 2018-07-06 DIAGNOSIS — M25.551: ICD-10-CM

## 2018-07-06 DIAGNOSIS — M16.0: ICD-10-CM

## 2018-07-06 DIAGNOSIS — Z01.812: ICD-10-CM

## 2018-07-06 DIAGNOSIS — M25.552: Primary | ICD-10-CM

## 2018-08-13 ENCOUNTER — HOSPITAL ENCOUNTER (OUTPATIENT)
Dept: HOSPITAL 84 - D.LABREF | Age: 78
Discharge: HOME | End: 2018-08-13
Attending: ORTHOPAEDIC SURGERY
Payer: MEDICARE

## 2018-08-13 VITALS — BODY MASS INDEX: 20.5 KG/M2

## 2018-08-13 DIAGNOSIS — M25.551: Primary | ICD-10-CM

## 2018-08-13 DIAGNOSIS — Z11.8: ICD-10-CM

## 2018-08-29 ENCOUNTER — HOSPITAL ENCOUNTER (INPATIENT)
Dept: HOSPITAL 84 - D.SDCHOLD | Age: 78
LOS: 8 days | Discharge: HOME | DRG: 470 | End: 2018-09-06
Attending: ORTHOPAEDIC SURGERY | Admitting: ORTHOPAEDIC SURGERY
Payer: MEDICARE

## 2018-08-29 VITALS
WEIGHT: 110 LBS | BODY MASS INDEX: 20.77 KG/M2 | HEIGHT: 61 IN | WEIGHT: 110 LBS | HEIGHT: 61 IN | BODY MASS INDEX: 20.77 KG/M2 | BODY MASS INDEX: 20.77 KG/M2 | BODY MASS INDEX: 20.77 KG/M2

## 2018-08-29 DIAGNOSIS — I10: ICD-10-CM

## 2018-08-29 DIAGNOSIS — F17.210: ICD-10-CM

## 2018-08-29 DIAGNOSIS — R05: ICD-10-CM

## 2018-08-29 DIAGNOSIS — M16.11: Primary | ICD-10-CM

## 2018-08-29 LAB
ANION GAP SERPL CALC-SCNC: 11.6 MMOL/L (ref 8–16)
APPEARANCE UR: CLEAR
APTT BLD: 27.5 SECONDS (ref 22.8–39.4)
BASOPHILS NFR BLD AUTO: 0.3 % (ref 0–2)
BILIRUB SERPL-MCNC: NEGATIVE MG/DL
BUN SERPL-MCNC: 11 MG/DL (ref 7–18)
CALCIUM SERPL-MCNC: 8.7 MG/DL (ref 8.5–10.1)
CHLORIDE SERPL-SCNC: 100 MMOL/L (ref 98–107)
CO2 SERPL-SCNC: 28.6 MMOL/L (ref 21–32)
COLOR UR: YELLOW
CREAT SERPL-MCNC: 0.8 MG/DL (ref 0.6–1.3)
EOSINOPHIL NFR BLD: 1.6 % (ref 0–7)
ERYTHROCYTE [DISTWIDTH] IN BLOOD BY AUTOMATED COUNT: 15.4 % (ref 11.5–14.5)
GLUCOSE SERPL-MCNC: 106 MG/DL (ref 74–106)
GLUCOSE SERPL-MCNC: NEGATIVE MG/DL
HCT VFR BLD CALC: 40.3 % (ref 36–48)
HGB BLD-MCNC: 13.6 G/DL (ref 12–16)
IMM GRANULOCYTES NFR BLD: 0.2 % (ref 0–5)
INR PPP: 0.85 (ref 0.85–1.17)
KETONES UR STRIP-MCNC: NEGATIVE MG/DL
LYMPHOCYTES NFR BLD AUTO: 25.1 % (ref 15–50)
MCH RBC QN AUTO: 29.5 PG (ref 26–34)
MCHC RBC AUTO-ENTMCNC: 33.7 G/DL (ref 31–37)
MCV RBC: 87.4 FL (ref 80–100)
MONOCYTES NFR BLD: 8.8 % (ref 2–11)
NEUTROPHILS NFR BLD AUTO: 64 % (ref 40–80)
NITRITE UR-MCNC: NEGATIVE MG/ML
OSMOLALITY SERPL CALC.SUM OF ELEC: 270 MOSM/KG (ref 275–300)
PH UR STRIP: 6 [PH] (ref 5–6)
PLATELET # BLD: 289 10X3/UL (ref 130–400)
PMV BLD AUTO: 9.7 FL (ref 7.4–10.4)
POTASSIUM SERPL-SCNC: 4.2 MMOL/L (ref 3.5–5.1)
PROT UR-MCNC: NEGATIVE MG/DL
PROTHROMBIN TIME: 11.2 SECONDS (ref 11.6–15)
RBC # BLD AUTO: 4.61 10X6/UL (ref 4–5.4)
SODIUM SERPL-SCNC: 136 MMOL/L (ref 136–145)
SP GR UR STRIP: 1.01 (ref 1–1.02)
UROBILINOGEN UR-MCNC: NORMAL MG/DL
WBC # BLD AUTO: 10 10X3/UL (ref 4.8–10.8)

## 2018-09-04 VITALS — SYSTOLIC BLOOD PRESSURE: 107 MMHG | DIASTOLIC BLOOD PRESSURE: 50 MMHG

## 2018-09-04 VITALS — SYSTOLIC BLOOD PRESSURE: 106 MMHG | DIASTOLIC BLOOD PRESSURE: 52 MMHG

## 2018-09-04 VITALS — DIASTOLIC BLOOD PRESSURE: 52 MMHG | SYSTOLIC BLOOD PRESSURE: 93 MMHG

## 2018-09-04 VITALS — SYSTOLIC BLOOD PRESSURE: 113 MMHG | DIASTOLIC BLOOD PRESSURE: 59 MMHG

## 2018-09-04 VITALS — DIASTOLIC BLOOD PRESSURE: 50 MMHG | SYSTOLIC BLOOD PRESSURE: 107 MMHG

## 2018-09-04 VITALS — SYSTOLIC BLOOD PRESSURE: 106 MMHG | DIASTOLIC BLOOD PRESSURE: 68 MMHG

## 2018-09-04 VITALS — DIASTOLIC BLOOD PRESSURE: 63 MMHG | SYSTOLIC BLOOD PRESSURE: 120 MMHG

## 2018-09-04 VITALS — SYSTOLIC BLOOD PRESSURE: 142 MMHG | DIASTOLIC BLOOD PRESSURE: 72 MMHG

## 2018-09-04 VITALS — SYSTOLIC BLOOD PRESSURE: 116 MMHG | DIASTOLIC BLOOD PRESSURE: 64 MMHG

## 2018-09-04 VITALS — SYSTOLIC BLOOD PRESSURE: 130 MMHG | DIASTOLIC BLOOD PRESSURE: 54 MMHG

## 2018-09-04 PROCEDURE — 0SR90JZ REPLACEMENT OF RIGHT HIP JOINT WITH SYNTHETIC SUBSTITUTE, OPEN APPROACH: ICD-10-PCS | Performed by: ORTHOPAEDIC SURGERY

## 2018-09-05 VITALS — DIASTOLIC BLOOD PRESSURE: 64 MMHG | SYSTOLIC BLOOD PRESSURE: 103 MMHG

## 2018-09-05 VITALS — DIASTOLIC BLOOD PRESSURE: 56 MMHG | SYSTOLIC BLOOD PRESSURE: 122 MMHG

## 2018-09-05 VITALS — DIASTOLIC BLOOD PRESSURE: 61 MMHG | SYSTOLIC BLOOD PRESSURE: 124 MMHG

## 2018-09-05 VITALS — DIASTOLIC BLOOD PRESSURE: 41 MMHG | SYSTOLIC BLOOD PRESSURE: 108 MMHG

## 2018-09-05 VITALS — DIASTOLIC BLOOD PRESSURE: 63 MMHG | SYSTOLIC BLOOD PRESSURE: 138 MMHG

## 2018-09-05 LAB
ERYTHROCYTE [DISTWIDTH] IN BLOOD BY AUTOMATED COUNT: 14.8 % (ref 11.5–14.5)
HCT VFR BLD CALC: 30.8 % (ref 36–48)
HGB BLD-MCNC: 10 G/DL (ref 12–16)
MCH RBC QN AUTO: 29.1 PG (ref 26–34)
MCHC RBC AUTO-ENTMCNC: 32.5 G/DL (ref 31–37)
MCV RBC: 89.5 FL (ref 80–100)
PLATELET # BLD: 260 10X3/UL (ref 130–400)
PMV BLD AUTO: 9.8 FL (ref 7.4–10.4)
RBC # BLD AUTO: 3.44 10X6/UL (ref 4–5.4)
WBC # BLD AUTO: 11.4 10X3/UL (ref 4.8–10.8)

## 2018-09-06 VITALS — SYSTOLIC BLOOD PRESSURE: 125 MMHG | DIASTOLIC BLOOD PRESSURE: 68 MMHG

## 2018-09-06 VITALS — DIASTOLIC BLOOD PRESSURE: 74 MMHG | SYSTOLIC BLOOD PRESSURE: 131 MMHG

## 2018-09-06 VITALS — DIASTOLIC BLOOD PRESSURE: 71 MMHG | SYSTOLIC BLOOD PRESSURE: 145 MMHG

## 2018-09-06 LAB
ALBUMIN SERPL-MCNC: 2.7 G/DL (ref 3.4–5)
ALP SERPL-CCNC: 50 U/L (ref 46–116)
ALT SERPL-CCNC: 23 U/L (ref 10–68)
ANION GAP SERPL CALC-SCNC: 11.9 MMOL/L (ref 8–16)
BASOPHILS NFR BLD AUTO: 0.1 % (ref 0–2)
BILIRUB SERPL-MCNC: 0.62 MG/DL (ref 0.2–1.3)
BUN SERPL-MCNC: 6 MG/DL (ref 7–18)
CALCIUM SERPL-MCNC: 7.6 MG/DL (ref 8.5–10.1)
CHLORIDE SERPL-SCNC: 104 MMOL/L (ref 98–107)
CO2 SERPL-SCNC: 24.9 MMOL/L (ref 21–32)
CREAT SERPL-MCNC: 0.7 MG/DL (ref 0.6–1.3)
EOSINOPHIL NFR BLD: 1.6 % (ref 0–7)
ERYTHROCYTE [DISTWIDTH] IN BLOOD BY AUTOMATED COUNT: 14.8 % (ref 11.5–14.5)
GLOBULIN SER-MCNC: 3 G/L
GLUCOSE SERPL-MCNC: 103 MG/DL (ref 74–106)
HCT VFR BLD CALC: 29.9 % (ref 36–48)
HGB BLD-MCNC: 9.9 G/DL (ref 12–16)
IMM GRANULOCYTES NFR BLD: 0.2 % (ref 0–5)
LYMPHOCYTES NFR BLD AUTO: 17.9 % (ref 15–50)
MCH RBC QN AUTO: 29.5 PG (ref 26–34)
MCHC RBC AUTO-ENTMCNC: 33.1 G/DL (ref 31–37)
MCV RBC: 89 FL (ref 80–100)
MONOCYTES NFR BLD: 10.2 % (ref 2–11)
NEUTROPHILS NFR BLD AUTO: 70 % (ref 40–80)
OSMOLALITY SERPL CALC.SUM OF ELEC: 271 MOSM/KG (ref 275–300)
PLATELET # BLD: 246 10X3/UL (ref 130–400)
PMV BLD AUTO: 10 FL (ref 7.4–10.4)
POTASSIUM SERPL-SCNC: 3.8 MMOL/L (ref 3.5–5.1)
PROT SERPL-MCNC: 5.7 G/DL (ref 6.4–8.2)
RBC # BLD AUTO: 3.36 10X6/UL (ref 4–5.4)
SODIUM SERPL-SCNC: 137 MMOL/L (ref 136–145)
WBC # BLD AUTO: 9.8 10X3/UL (ref 4.8–10.8)

## 2018-09-28 ENCOUNTER — HOSPITAL ENCOUNTER (EMERGENCY)
Dept: HOSPITAL 84 - D.ER | Age: 78
Discharge: HOME | End: 2018-09-28
Payer: MEDICARE

## 2018-09-28 VITALS
WEIGHT: 106.22 LBS | HEIGHT: 61 IN | HEIGHT: 61 IN | WEIGHT: 106.22 LBS | BODY MASS INDEX: 20.05 KG/M2 | BODY MASS INDEX: 20.05 KG/M2

## 2018-09-28 VITALS — DIASTOLIC BLOOD PRESSURE: 68 MMHG | SYSTOLIC BLOOD PRESSURE: 146 MMHG

## 2018-09-28 DIAGNOSIS — F17.200: ICD-10-CM

## 2018-09-28 DIAGNOSIS — M25.551: Primary | ICD-10-CM
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
## 2018-09-28 DIAGNOSIS — I10: ICD-10-CM
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
## 2018-12-13 ENCOUNTER — HOSPITAL ENCOUNTER (OUTPATIENT)
Dept: HOSPITAL 84 - D.MRI | Age: 78
Discharge: HOME | End: 2018-12-13
Attending: ORTHOPAEDIC SURGERY
Payer: MEDICARE

## 2018-12-13 VITALS — BODY MASS INDEX: 20 KG/M2

## 2018-12-13 DIAGNOSIS — M54.16: Primary | ICD-10-CM

## 2019-04-08 ENCOUNTER — HOSPITAL ENCOUNTER (OUTPATIENT)
Dept: HOSPITAL 84 - D.CT | Age: 79
Discharge: HOME | End: 2019-04-08
Attending: FAMILY MEDICINE
Payer: MEDICARE

## 2019-04-08 VITALS — BODY MASS INDEX: 20 KG/M2

## 2019-04-08 DIAGNOSIS — R91.8: Primary | ICD-10-CM

## 2019-07-24 ENCOUNTER — HOSPITAL ENCOUNTER (INPATIENT)
Dept: HOSPITAL 84 - D.ER | Age: 79
LOS: 4 days | Discharge: HOME | DRG: 193 | End: 2019-07-28
Attending: FAMILY MEDICINE | Admitting: FAMILY MEDICINE
Payer: MEDICARE

## 2019-07-24 VITALS — SYSTOLIC BLOOD PRESSURE: 130 MMHG | DIASTOLIC BLOOD PRESSURE: 69 MMHG

## 2019-07-24 VITALS
BODY MASS INDEX: 20.77 KG/M2 | WEIGHT: 110 LBS | BODY MASS INDEX: 20.77 KG/M2 | WEIGHT: 110 LBS | HEIGHT: 61 IN | HEIGHT: 61 IN | BODY MASS INDEX: 20.77 KG/M2

## 2019-07-24 VITALS — SYSTOLIC BLOOD PRESSURE: 135 MMHG | DIASTOLIC BLOOD PRESSURE: 78 MMHG

## 2019-07-24 VITALS — SYSTOLIC BLOOD PRESSURE: 142 MMHG | DIASTOLIC BLOOD PRESSURE: 76 MMHG

## 2019-07-24 VITALS — DIASTOLIC BLOOD PRESSURE: 71 MMHG | SYSTOLIC BLOOD PRESSURE: 122 MMHG

## 2019-07-24 DIAGNOSIS — M79.672: ICD-10-CM

## 2019-07-24 DIAGNOSIS — J44.0: ICD-10-CM

## 2019-07-24 DIAGNOSIS — E43: ICD-10-CM

## 2019-07-24 DIAGNOSIS — J18.9: Primary | ICD-10-CM

## 2019-07-24 DIAGNOSIS — M54.9: ICD-10-CM

## 2019-07-24 DIAGNOSIS — F17.213: ICD-10-CM

## 2019-07-24 DIAGNOSIS — J96.01: ICD-10-CM

## 2019-07-24 DIAGNOSIS — G89.29: ICD-10-CM

## 2019-07-24 DIAGNOSIS — J30.9: ICD-10-CM

## 2019-07-24 DIAGNOSIS — J44.1: ICD-10-CM

## 2019-07-24 LAB
ALBUMIN SERPL-MCNC: 3.9 G/DL (ref 3.4–5)
ALP SERPL-CCNC: 74 U/L (ref 46–116)
ALT SERPL-CCNC: 13 U/L (ref 10–68)
ANION GAP SERPL CALC-SCNC: 14 MMOL/L (ref 8–16)
APPEARANCE UR: CLEAR
BILIRUB SERPL-MCNC: 0.84 MG/DL (ref 0.2–1.3)
BILIRUB SERPL-MCNC: NEGATIVE MG/DL
BUN SERPL-MCNC: 10 MG/DL (ref 7–18)
CALCIUM SERPL-MCNC: 9 MG/DL (ref 8.5–10.1)
CHLORIDE SERPL-SCNC: 101 MMOL/L (ref 98–107)
CK MB SERPL-MCNC: 0.5 U/L (ref 0–3.6)
CK MB SERPL-MCNC: 1.2 U/L (ref 0–3.6)
CK SERPL-CCNC: 4 UL (ref 21–215)
CK SERPL-CCNC: 78 UL (ref 21–215)
CK SERPL-CCNC: 86 UL (ref 21–215)
CO2 SERPL-SCNC: 25.6 MMOL/L (ref 21–32)
COLOR UR: YELLOW
CREAT SERPL-MCNC: 0.8 MG/DL (ref 0.6–1.3)
ERYTHROCYTE [DISTWIDTH] IN BLOOD BY AUTOMATED COUNT: 13.5 % (ref 11.5–14.5)
GLOBULIN SER-MCNC: 3.2 G/L
GLUCOSE SERPL-MCNC: 124 MG/DL (ref 74–106)
GLUCOSE SERPL-MCNC: NEGATIVE MG/DL
HCT VFR BLD CALC: 37.7 % (ref 36–48)
HGB BLD-MCNC: 12.8 G/DL (ref 12–16)
KETONES UR STRIP-MCNC: NEGATIVE MG/DL
LIPASE SERPL-CCNC: 233 U/L (ref 73–393)
LYMPHOCYTES NFR BLD AUTO: 14.2 % (ref 15–50)
MAGNESIUM SERPL-MCNC: 1.8 MG/DL (ref 1.8–2.4)
MCH RBC QN AUTO: 29.4 PG (ref 26–34)
MCHC RBC AUTO-ENTMCNC: 34 G/DL (ref 31–37)
MCV RBC: 86.7 FL (ref 80–100)
NEUTROPHILS NFR BLD AUTO: 72.5 % (ref 40–80)
NITRITE UR-MCNC: NEGATIVE MG/ML
NT-PROBNP SERPL-MCNC: 648 PG/ML (ref 0–450)
OSMOLALITY SERPL CALC.SUM OF ELEC: 273 MOSM/KG (ref 275–300)
PH UR STRIP: 7 [PH] (ref 5–6)
PLATELET # BLD: 224 10X3/UL (ref 130–400)
PMV BLD AUTO: 9.7 FL (ref 7.4–10.4)
POTASSIUM SERPL-SCNC: 3.6 MMOL/L (ref 3.5–5.1)
PROT SERPL-MCNC: 7.1 G/DL (ref 6.4–8.2)
PROT UR-MCNC: NEGATIVE MG/DL
RBC # BLD AUTO: 4.35 10X6/UL (ref 4–5.4)
SODIUM SERPL-SCNC: 137 MMOL/L (ref 136–145)
SP GR UR STRIP: 1.01 (ref 1–1.02)
TROPONIN I SERPL-MCNC: 0.05 NG/ML (ref 0–0.06)
TROPONIN I SERPL-MCNC: < 0.017 NG/ML (ref 0–0.06)
TROPONIN I SERPL-MCNC: < 0.017 NG/ML (ref 0–0.06)
TSH SERPL-ACNC: 1.44 UIU/ML (ref 0.36–3.74)
UROBILINOGEN UR-MCNC: NORMAL MG/DL
WBC # BLD AUTO: 11 10X3/UL (ref 4.8–10.8)

## 2019-07-25 VITALS — SYSTOLIC BLOOD PRESSURE: 116 MMHG | DIASTOLIC BLOOD PRESSURE: 55 MMHG

## 2019-07-25 VITALS — SYSTOLIC BLOOD PRESSURE: 112 MMHG | DIASTOLIC BLOOD PRESSURE: 76 MMHG

## 2019-07-25 VITALS — DIASTOLIC BLOOD PRESSURE: 47 MMHG | SYSTOLIC BLOOD PRESSURE: 105 MMHG

## 2019-07-25 VITALS — SYSTOLIC BLOOD PRESSURE: 107 MMHG | DIASTOLIC BLOOD PRESSURE: 73 MMHG

## 2019-07-25 VITALS — DIASTOLIC BLOOD PRESSURE: 49 MMHG | SYSTOLIC BLOOD PRESSURE: 114 MMHG

## 2019-07-25 LAB
ALBUMIN SERPL-MCNC: 3.6 G/DL (ref 3.4–5)
ALP SERPL-CCNC: 68 U/L (ref 46–116)
ALT SERPL-CCNC: 13 U/L (ref 10–68)
ANION GAP SERPL CALC-SCNC: 18 MMOL/L (ref 8–16)
BASOPHILS NFR BLD AUTO: 0.1 % (ref 0–2)
BILIRUB SERPL-MCNC: 0.77 MG/DL (ref 0.2–1.3)
BUN SERPL-MCNC: 21 MG/DL (ref 7–18)
CALCIUM SERPL-MCNC: 8.6 MG/DL (ref 8.5–10.1)
CHLORIDE SERPL-SCNC: 101 MMOL/L (ref 98–107)
CK MB SERPL-MCNC: 1.5 U/L (ref 0–3.6)
CK SERPL-CCNC: 116 UL (ref 21–215)
CO2 SERPL-SCNC: 22.7 MMOL/L (ref 21–32)
CREAT SERPL-MCNC: 1.1 MG/DL (ref 0.6–1.3)
EOSINOPHIL NFR BLD: 0 % (ref 0–7)
ERYTHROCYTE [DISTWIDTH] IN BLOOD BY AUTOMATED COUNT: 13.4 % (ref 11.5–14.5)
GLOBULIN SER-MCNC: 3.2 G/L
GLUCOSE SERPL-MCNC: 151 MG/DL (ref 74–106)
HCT VFR BLD CALC: 38.3 % (ref 36–48)
HGB BLD-MCNC: 12.8 G/DL (ref 12–16)
IMM GRANULOCYTES NFR BLD: 0.2 % (ref 0–5)
LYMPHOCYTES NFR BLD AUTO: 12.8 % (ref 15–50)
MCH RBC QN AUTO: 29 PG (ref 26–34)
MCHC RBC AUTO-ENTMCNC: 33.4 G/DL (ref 31–37)
MCV RBC: 86.8 FL (ref 80–100)
MONOCYTES NFR BLD: 3.8 % (ref 2–11)
NEUTROPHILS NFR BLD AUTO: 83.1 % (ref 40–80)
OSMOLALITY SERPL CALC.SUM OF ELEC: 279 MOSM/KG (ref 275–300)
PLATELET # BLD: 234 10X3/UL (ref 130–400)
PMV BLD AUTO: 10.7 FL (ref 7.4–10.4)
POTASSIUM SERPL-SCNC: 4.7 MMOL/L (ref 3.5–5.1)
PROT SERPL-MCNC: 6.8 G/DL (ref 6.4–8.2)
RBC # BLD AUTO: 4.41 10X6/UL (ref 4–5.4)
SODIUM SERPL-SCNC: 137 MMOL/L (ref 136–145)
TROPONIN I SERPL-MCNC: < 0.017 NG/ML (ref 0–0.06)
WBC # BLD AUTO: 10.7 10X3/UL (ref 4.8–10.8)

## 2019-07-26 VITALS — SYSTOLIC BLOOD PRESSURE: 130 MMHG | DIASTOLIC BLOOD PRESSURE: 70 MMHG

## 2019-07-26 VITALS — DIASTOLIC BLOOD PRESSURE: 57 MMHG | SYSTOLIC BLOOD PRESSURE: 134 MMHG

## 2019-07-26 VITALS — DIASTOLIC BLOOD PRESSURE: 62 MMHG | SYSTOLIC BLOOD PRESSURE: 106 MMHG

## 2019-07-26 LAB
ALBUMIN SERPL-MCNC: 3.1 G/DL (ref 3.4–5)
ALP SERPL-CCNC: 51 U/L (ref 46–116)
ALT SERPL-CCNC: 15 U/L (ref 10–68)
ANION GAP SERPL CALC-SCNC: 13.2 MMOL/L (ref 8–16)
BASOPHILS NFR BLD AUTO: 0 % (ref 0–2)
BILIRUB SERPL-MCNC: 0.43 MG/DL (ref 0.2–1.3)
BUN SERPL-MCNC: 28 MG/DL (ref 7–18)
CALCIUM SERPL-MCNC: 8.4 MG/DL (ref 8.5–10.1)
CHLORIDE SERPL-SCNC: 104 MMOL/L (ref 98–107)
CO2 SERPL-SCNC: 25.4 MMOL/L (ref 21–32)
CREAT SERPL-MCNC: 0.9 MG/DL (ref 0.6–1.3)
EOSINOPHIL NFR BLD: 0 % (ref 0–7)
ERYTHROCYTE [DISTWIDTH] IN BLOOD BY AUTOMATED COUNT: 13.5 % (ref 11.5–14.5)
GLOBULIN SER-MCNC: 3.3 G/L
GLUCOSE SERPL-MCNC: 133 MG/DL (ref 74–106)
HCT VFR BLD CALC: 34.1 % (ref 36–48)
HGB BLD-MCNC: 11.4 G/DL (ref 12–16)
IMM GRANULOCYTES NFR BLD: 0.3 % (ref 0–5)
LYMPHOCYTES NFR BLD AUTO: 6.1 % (ref 15–50)
MCH RBC QN AUTO: 28.6 PG (ref 26–34)
MCHC RBC AUTO-ENTMCNC: 33.4 G/DL (ref 31–37)
MCV RBC: 85.5 FL (ref 80–100)
MONOCYTES NFR BLD: 7.7 % (ref 2–11)
NEUTROPHILS NFR BLD AUTO: 85.9 % (ref 40–80)
OSMOLALITY SERPL CALC.SUM OF ELEC: 283 MOSM/KG (ref 275–300)
PLATELET # BLD: 241 10X3/UL (ref 130–400)
PMV BLD AUTO: 10.5 FL (ref 7.4–10.4)
POTASSIUM SERPL-SCNC: 4.6 MMOL/L (ref 3.5–5.1)
PROT SERPL-MCNC: 6.4 G/DL (ref 6.4–8.2)
RBC # BLD AUTO: 3.99 10X6/UL (ref 4–5.4)
SODIUM SERPL-SCNC: 138 MMOL/L (ref 136–145)
WBC # BLD AUTO: 15.7 10X3/UL (ref 4.8–10.8)

## 2019-07-26 NOTE — MORECARE
CASE MANAGEMENT DISCHARGE SUMMARY
 
 
PATIENT: LIZA MEDEL INGRID                  UNIT: [id number]
ACCOUNT#: [id number]                       ADM DATE: 19
AGE: 78     : 36  SEX: F            ROOM/BED: D.2751    
AUTHOR: ARISTEO MURRAY                             PHYSICIAN:                               
 
REFERRING PHYSICIAN: MIO BLUE MD           
DATE OF SERVICE: 19
Discharge Plan
 
 
Patient Name: LIZA MEDEL
Facility: [facility]:[place]
Encounter #: [id number]
Medical Record #: [id number]
: 1940
Planned Disposition: Home
Anticipated Discharge Date: 19
 
Discharge Date: 
Expected LOS: 3
Initial Reviewer: [id number]
Initial Review Date: 2019
Generated: 19   2:03 pm 
 DCPIA - Discharge Planning Initial Assessment
 
Updated by [email or web address]: Manish Jackson on 19   1:02 pm
*  Is the patient Alert and Oriented?
Yes
*  How many steps to enter\exit or inside your home?
 
*  PCP
DR. WOLF
*  Pharmacy
[facility]
*  Preadmission Environment
Home with Family
*  ADLs
Independent
*  Equipment
Bedside Commode
Nebulizer
Rolling Walker
*  Other Equipment
NONE
*  List name and contact numbers for known caregivers / representatives who 
currently or will assist patient after discharge:
DEBBIE MEDEL, DTR, [phone]
 
*  Verbal permission to speak to the caregivers and representatives has been 
obtained from the patient.
N/A
*  Community resources currently utilized
None
*  Please name any agencies selected above.
NONE
*  Additional services required to return to the preadmission environment?
No
*  Can the patient safely return to the preadmission environment?
Yes
*  Has this patient been hospitalized within the prior 30 days at any 
hospital?
No
 
 
 
 
 
Coverage Notice
 
Reviewer: [id number] - Manish Jackson
 
Notice Issued Date-Time: 2019  12:50
Notice Type: IM Discharge Notice
 
Notice Delivered To: Patient
Relationship to Patient: 
Representative Name: 
 
Delivery Method: HAND - Hand Delivered
Marii Days:
Prior Verbal Notification: 
 
Recipient Understood Notice: Yes
Recipient Signature: Yes
Med Rec Note Co-signed by Attending:
 
Coverage Notice Comment:  
Patient Name: LIZA MEDEL
 
Encounter #: [id number]
Page [id number]
 
 
 
 
 
Electronically Signed by ARISTEO MURRAY on 19 at 1303
 
 
 
 
 
 
**All edits/amendments must be made on the electronic document**
 
DICTATION DATE: 19 1303     : BATOOL  19 1303     
RPT#: [id number]                                DC DATE:        
                                               STATUS: ADM IN  
[facility]
 [place] [address]
***END OF REPORT***

## 2019-07-26 NOTE — MORECARE
CASE MANAGEMENT DISCHARGE SUMMARY
 
 
PATIENT: LIZA MEDEL                  UNIT: [id number]
ACCOUNT#: [id number]                       ADM DATE: 19
AGE: 78     : 36  SEX: F            ROOM/BED: D.7812    
AUTHOR: TERRI,DOC                             PHYSICIAN:                               
 
REFERRING PHYSICIAN: MIO BLUE MD           
DATE OF SERVICE: 19
Discharge Plan
 
 
Patient Name: LIZA MEDEL
Facility: [facility]:[place]
Encounter #: [id number]
Medical Record #: [id number]
: 1940
Planned Disposition: Home
Anticipated Discharge Date: 19
 
Discharge Date: 
Expected LOS: 3
Initial Reviewer: [id number]
Initial Review Date: 2019
Generated: 19   2:11 pm 
Comments
 
DCP- Discharge Planning
 
Updated by [id number]: Manish Jackson on 19  12:07 pm CT
Patient Name: LIZA MDEEL                                     
Admission Status: ER   
Accout number: [id number]                              
Admission Date: 2019   
: 1940                                                        
Admission Diagnosis:   
Attending: MIO ALCARAZ                                                
Current LOS:  2   
  
Anticipated DC Date: 2019   
Planned Disposition: Home   
Primary Insurance: MEDICARE A & B   
  
  
Discharge Planning Comments:   
CM RECEIVED ORDER FOR DISCHARGE PLANNING.  CM MET WITH PT IN ROOM TO DISCUSS 
DISCHARGE PLANNING AND NEEDS. PT REPORTS LIVING AT HOME INDEPENDENTLY WITH 
HER DAUGHTER. PT HAS ROLLING WALKER, BEDSIDE COMMODE AND NEBULIZER WITH NO 
MEDICAL EQUIPMENT PROVIDER PREFERENCE.  PT HAS NO OUTSIDE SERVICES ASSISTING 
 
IN THE HOME. CM DISCUSSED AVAILABILITY OF HOME HEALTH, REHAB SERVICES AND 
MEDICAL EQUIPMENT. PT DENIES DISCHARGE NEEDS, REPORTS HER  DAUGHTER WILL PICK 
HER UP FOR DISCHARGE HOME. IMPORTANT MESSAGE FROM MEDICARE PROVIDED AND 
EXPLAINED.  CM REVIEWED THERAPY NOTES IN ROOM WITH PT, ENCOURAGED PT TO 
CONSIDER REHAB OR AT LEAST HOME HEALTH.  PT DECLINED BOTH AND REPORTS SHE 
WANTS TO GET HOME AND SEE HOW SHE DOES FIRST.  CM EXPLAINED HOW TO CONTACT 
PRIMARY DOCTOR FROM HOME TO REQUEST HOME HEALTH, PT REPORTS UNDERSTANDING.  
PT'S FRIEND WHO ARRIVED ENCOURAGED PT TO REQUEST A WALKER WITH 4 WHEELS AND 
BRAKES.  PT REPORTS HAVING ROLLING WALKER AT HOME BUT IT DOES NOT HAVE A SEAT 
AND DOES NOT WANT CM TO ORDER A NEW WALKER FOR HER.  PT STATES THAT SHE WILL 
DISCUSS THIS WITH HER PRIMARY CARE DOCTOR AFTER SHE GETS HOME IF SHE FEELS 
THAT HER CURRENT WALKER IS NOT ENOUGH.  CM LEFT PT WITH CM CONTACT 
INFORMATION.  
  
PT PLANS TO DISCHARGE HOME WITH FAMILY, DENIES DISCHARGE NEEDS.  DAUGHTER TO 
TRANSPORT HOME AT DISCHARGE.  CM TO FOLLOW AND ASSIST IF NEEDED.  
  
: Manish Jackson
 DCPIA - Discharge Planning Initial Assessment
 
Updated by [id number]: Manish Jackson on 19   1:02 pm
*  Is the patient Alert and Oriented?
Yes
*  How many steps to enter\exit or inside your home?
 
*  PCP
DR. WOLF
*  Pharmacy
[facility]
*  Preadmission Environment
Home with Family
*  ADLs
Independent
*  Equipment
Bedside Commode
Nebulizer
Rolling Walker
*  Other Equipment
NONE
*  List name and contact numbers for known caregivers / representatives who 
currently or will assist patient after discharge:
DEBBIE MEDEL, DTR, [phone]
*  Verbal permission to speak to the caregivers and representatives has been 
obtained from the patient.
N/A
*  Community resources currently utilized
None
*  Please name any agencies selected above.
NONE
*  Additional services required to return to the preadmission environment?
No
*  Can the patient safely return to the preadmission environment?
 
Yes
*  Has this patient been hospitalized within the prior 30 days at any 
hospital?
No
 
 
 
 
 
Coverage Notice
 
Reviewer: [id number] - Manish Jackson
 
Notice Issued Date-Time: 2019  12:50
Notice Type: IM Discharge Notice
 
Notice Delivered To: Patient
Relationship to Patient: 
Representative Name: 
 
Delivery Method: HAND - Hand Delivered
Marii Days:
Prior Verbal Notification: 
 
Recipient Understood Notice: Yes
Recipient Signature: Yes
Med Rec Note Co-signed by Attending:
 
Coverage Notice Comment:  
 
Last DP export: 19  12:03 p
Patient Name: LIZA MEDEL
Encounter #: [id number]
Page [id number]
 
 
 
 
 
Electronically Signed by ARISTEO MURRAY on 19 at 1311
 
 
 
 
 
 
**All edits/amendments must be made on the electronic document**
 
DICTATION DATE: 19 1310     : BATOOL  19 1310     
RPT#: [id number]                                [place] DATE:        
                                               STATUS: ADM IN  
[facility]
 [place] [address]
***END OF REPORT***

## 2019-07-27 VITALS — DIASTOLIC BLOOD PRESSURE: 77 MMHG | SYSTOLIC BLOOD PRESSURE: 139 MMHG

## 2019-07-27 VITALS — DIASTOLIC BLOOD PRESSURE: 76 MMHG | SYSTOLIC BLOOD PRESSURE: 139 MMHG

## 2019-07-27 VITALS — DIASTOLIC BLOOD PRESSURE: 63 MMHG | SYSTOLIC BLOOD PRESSURE: 126 MMHG

## 2019-07-27 VITALS — SYSTOLIC BLOOD PRESSURE: 115 MMHG | DIASTOLIC BLOOD PRESSURE: 57 MMHG

## 2019-07-27 VITALS — SYSTOLIC BLOOD PRESSURE: 150 MMHG | DIASTOLIC BLOOD PRESSURE: 77 MMHG

## 2019-07-27 LAB
ALBUMIN SERPL-MCNC: 3 G/DL (ref 3.4–5)
ALP SERPL-CCNC: 47 U/L (ref 46–116)
ALT SERPL-CCNC: 14 U/L (ref 10–68)
ANION GAP SERPL CALC-SCNC: 12.3 MMOL/L (ref 8–16)
BASOPHILS NFR BLD AUTO: 0 % (ref 0–2)
BILIRUB SERPL-MCNC: 0.34 MG/DL (ref 0.2–1.3)
BUN SERPL-MCNC: 21 MG/DL (ref 7–18)
CALCIUM SERPL-MCNC: 8.1 MG/DL (ref 8.5–10.1)
CHLORIDE SERPL-SCNC: 106 MMOL/L (ref 98–107)
CO2 SERPL-SCNC: 25.9 MMOL/L (ref 21–32)
CREAT SERPL-MCNC: 0.8 MG/DL (ref 0.6–1.3)
EOSINOPHIL NFR BLD: 0 % (ref 0–7)
ERYTHROCYTE [DISTWIDTH] IN BLOOD BY AUTOMATED COUNT: 13.7 % (ref 11.5–14.5)
GLOBULIN SER-MCNC: 3.1 G/L
GLUCOSE SERPL-MCNC: 129 MG/DL (ref 74–106)
HCT VFR BLD CALC: 32.3 % (ref 36–48)
HGB BLD-MCNC: 10.7 G/DL (ref 12–16)
IMM GRANULOCYTES NFR BLD: 0.2 % (ref 0–5)
LYMPHOCYTES NFR BLD AUTO: 10.2 % (ref 15–50)
MCH RBC QN AUTO: 28.5 PG (ref 26–34)
MCHC RBC AUTO-ENTMCNC: 33.1 G/DL (ref 31–37)
MCV RBC: 86.1 FL (ref 80–100)
MONOCYTES NFR BLD: 12.8 % (ref 2–11)
NEUTROPHILS NFR BLD AUTO: 76.8 % (ref 40–80)
OSMOLALITY SERPL CALC.SUM OF ELEC: 283 MOSM/KG (ref 275–300)
PLATELET # BLD: 221 10X3/UL (ref 130–400)
PMV BLD AUTO: 10.6 FL (ref 7.4–10.4)
POTASSIUM SERPL-SCNC: 4.2 MMOL/L (ref 3.5–5.1)
PROT SERPL-MCNC: 6.1 G/DL (ref 6.4–8.2)
RBC # BLD AUTO: 3.75 10X6/UL (ref 4–5.4)
SODIUM SERPL-SCNC: 140 MMOL/L (ref 136–145)
WBC # BLD AUTO: 11 10X3/UL (ref 4.8–10.8)

## 2019-07-28 VITALS — DIASTOLIC BLOOD PRESSURE: 80 MMHG | SYSTOLIC BLOOD PRESSURE: 154 MMHG

## 2019-07-28 VITALS — SYSTOLIC BLOOD PRESSURE: 154 MMHG | DIASTOLIC BLOOD PRESSURE: 75 MMHG

## 2019-07-28 VITALS — SYSTOLIC BLOOD PRESSURE: 144 MMHG | DIASTOLIC BLOOD PRESSURE: 76 MMHG

## 2019-07-28 VITALS — SYSTOLIC BLOOD PRESSURE: 114 MMHG | DIASTOLIC BLOOD PRESSURE: 89 MMHG

## 2019-07-28 LAB
ALBUMIN SERPL-MCNC: 3.2 G/DL (ref 3.4–5)
ALP SERPL-CCNC: 53 U/L (ref 46–116)
ALT SERPL-CCNC: 18 U/L (ref 10–68)
ANION GAP SERPL CALC-SCNC: 13.9 MMOL/L (ref 8–16)
BASOPHILS NFR BLD AUTO: 0 % (ref 0–2)
BILIRUB SERPL-MCNC: 0.38 MG/DL (ref 0.2–1.3)
BUN SERPL-MCNC: 20 MG/DL (ref 7–18)
CALCIUM SERPL-MCNC: 8.3 MG/DL (ref 8.5–10.1)
CHLORIDE SERPL-SCNC: 106 MMOL/L (ref 98–107)
CO2 SERPL-SCNC: 23.3 MMOL/L (ref 21–32)
CREAT SERPL-MCNC: 0.8 MG/DL (ref 0.6–1.3)
EOSINOPHIL NFR BLD: 0 % (ref 0–7)
ERYTHROCYTE [DISTWIDTH] IN BLOOD BY AUTOMATED COUNT: 13.7 % (ref 11.5–14.5)
GLOBULIN SER-MCNC: 3.3 G/L
GLUCOSE SERPL-MCNC: 121 MG/DL (ref 74–106)
HCT VFR BLD CALC: 35.4 % (ref 36–48)
HGB BLD-MCNC: 11.7 G/DL (ref 12–16)
IMM GRANULOCYTES NFR BLD: 0.4 % (ref 0–5)
LYMPHOCYTES NFR BLD AUTO: 8.6 % (ref 15–50)
MCH RBC QN AUTO: 28.6 PG (ref 26–34)
MCHC RBC AUTO-ENTMCNC: 33.1 G/DL (ref 31–37)
MCV RBC: 86.6 FL (ref 80–100)
MONOCYTES NFR BLD: 7.9 % (ref 2–11)
NEUTROPHILS NFR BLD AUTO: 83.1 % (ref 40–80)
OSMOLALITY SERPL CALC.SUM OF ELEC: 281 MOSM/KG (ref 275–300)
PLATELET # BLD: 231 10X3/UL (ref 130–400)
PMV BLD AUTO: 10.2 FL (ref 7.4–10.4)
POTASSIUM SERPL-SCNC: 4.2 MMOL/L (ref 3.5–5.1)
PROT SERPL-MCNC: 6.5 G/DL (ref 6.4–8.2)
RBC # BLD AUTO: 4.09 10X6/UL (ref 4–5.4)
SODIUM SERPL-SCNC: 139 MMOL/L (ref 136–145)
WBC # BLD AUTO: 13.2 10X3/UL (ref 4.8–10.8)

## 2019-07-29 NOTE — MORECARE
CASE MANAGEMENT DISCHARGE SUMMARY
 
 
PATIENT: LIZA MEDEL                  UNIT: [id number]
ACCOUNT#: [id number]                       ADM DATE: 19
AGE: 78     : 36  SEX: F            ROOM/BED: D.0105    
AUTHOR: TERRI,DOC                             PHYSICIAN:                               
 
REFERRING PHYSICIAN: MIO BLUE MD           
DATE OF SERVICE: 19
Discharge Plan
 
 
Patient Name: LIZA MEDEL
Facility: [facility]:[place]
Encounter #: [id number]
Medical Record #: [id number]
: 1940
Planned Disposition: Home
Anticipated Discharge Date: 19
 
Discharge Date: 2019
Expected LOS: 4
Initial Reviewer: [id number]
Initial Review Date: 2019
Generated: 19  11:11 am 
DCP- Discharge Planning
 
Updated by [id number]: Manish Jackson on 19  12:07 pm CT
Patient Name: LIZA MEDEL                                     
Admission Status: ER   
Accout number: [id number]                              
Admission Date: 2019   
: 1940                                                        
Admission Diagnosis:   
Attending: MIO ALCARAZ                                                
Current LOS:  2   
  
Anticipated DC Date: 2019   
Planned Disposition: Home   
Primary Insurance: MEDICARE A & B   
  
  
Discharge Planning Comments:   
CM RECEIVED ORDER FOR DISCHARGE PLANNING.  CM MET WITH PT IN ROOM TO DISCUSS 
DISCHARGE PLANNING AND NEEDS. PT REPORTS LIVING AT HOME INDEPENDENTLY WITH 
HER DAUGHTER. PT HAS ROLLING WALKER, BEDSIDE COMMODE AND NEBULIZER WITH NO 
MEDICAL EQUIPMENT PROVIDER PREFERENCE.  PT HAS NO OUTSIDE SERVICES ASSISTING 
IN THE HOME. CM DISCUSSED AVAILABILITY OF HOME HEALTH, REHAB SERVICES AND 
MEDICAL EQUIPMENT. PT DENIES DISCHARGE NEEDS, REPORTS HER  DAUGHTER WILL PICK 
 
HER UP FOR DISCHARGE HOME. IMPORTANT MESSAGE FROM MEDICARE PROVIDED AND 
EXPLAINED.  CM REVIEWED THERAPY NOTES IN ROOM WITH PT, ENCOURAGED PT TO 
CONSIDER REHAB OR AT LEAST HOME HEALTH.  PT DECLINED BOTH AND REPORTS SHE 
WANTS TO GET HOME AND SEE HOW SHE DOES FIRST.  CM EXPLAINED HOW TO CONTACT 
PRIMARY DOCTOR FROM HOME TO REQUEST HOME HEALTH, PT REPORTS UNDERSTANDING.  
PT'S FRIEND WHO ARRIVED ENCOURAGED PT TO REQUEST A WALKER WITH 4 WHEELS AND 
BRAKES.  PT REPORTS HAVING ROLLING WALKER AT HOME BUT IT DOES NOT HAVE A SEAT 
AND DOES NOT WANT CM TO ORDER A NEW WALKER FOR HER.  PT STATES THAT SHE WILL 
DISCUSS THIS WITH HER PRIMARY CARE DOCTOR AFTER SHE GETS HOME IF SHE FEELS 
THAT HER CURRENT WALKER IS NOT ENOUGH.  CM LEFT PT WITH CM CONTACT 
INFORMATION.  
  
PT PLANS TO DISCHARGE HOME WITH FAMILY, DENIES DISCHARGE NEEDS.  DAUGHTER TO 
TRANSPORT HOME AT DISCHARGE.  CM TO FOLLOW AND ASSIST IF NEEDED.  
  
: Manish Jackson
 DCPIA - Discharge Planning Initial Assessment
 
Updated by [id number]: Manish Jackson on 19   1:02 pm
*  Is the patient Alert and Oriented?
Yes
*  How many steps to enter\exit or inside your home?
 
*  PCP
DR. WOLF
*  Pharmacy
[facility]
*  Preadmission Environment
Home with Family
*  ADLs
Independent
*  Equipment
Bedside Commode
Nebulizer
Rolling Walker
*  Other Equipment
NONE
*  List name and contact numbers for known caregivers / representatives who 
currently or will assist patient after discharge:
DEBBIE MEDEL, DTR, [phone]
*  Verbal permission to speak to the caregivers and representatives has been 
obtained from the patient.
N/A
*  Community resources currently utilized
None
*  Please name any agencies selected above.
NONE
*  Additional services required to return to the preadmission environment?
No
*  Can the patient safely return to the preadmission environment?
Yes
*  Has this patient been hospitalized within the prior 30 days at any 
 
hospital?
No
 
 
 
 
 
Coverage Notice
 
Reviewer: [email or web address] - Manish Jackson
 
Notice Issued Date-Time: 2019  12:50
Notice Type: IM Discharge Notice
 
Notice Delivered To: Patient
Relationship to Patient: 
Representative Name: 
 
Delivery Method: HAND - Hand Delivered
Marii Days:
Prior Verbal Notification: 
 
Recipient Understood Notice: Yes
Recipient Signature: Yes
Med Rec Note Co-signed by Attending:
 
Coverage Notice Comment:  
 
Last DP export: 19  12:11 p
Patient Name: LIZA MEDEL
Encounter #: [id number]
Page [id number]
 
 
 
 
 
Electronically Signed by ARISTEO MURRAY on 19 at 1011
 
 
 
 
 
 
**All edits/amendments must be made on the electronic document**
 
DICTATION DATE: 19 1011     : BATOOL  19 1011     
RPT#: [id number]                                DC DATE:19
                                               STATUS: DIS IN  
[facility]
[address] [place] [address]
***END OF REPORT***

## 2019-09-27 ENCOUNTER — HOSPITAL ENCOUNTER (INPATIENT)
Dept: HOSPITAL 84 - D.SDCHOLD | Age: 79
LOS: 1 days | Discharge: HOME | DRG: 372 | End: 2019-09-28
Attending: FAMILY MEDICINE | Admitting: FAMILY MEDICINE
Payer: MEDICARE

## 2019-09-27 VITALS
HEIGHT: 61 IN | HEIGHT: 61 IN | WEIGHT: 111 LBS | BODY MASS INDEX: 20.96 KG/M2 | BODY MASS INDEX: 20.96 KG/M2 | BODY MASS INDEX: 20.96 KG/M2 | WEIGHT: 111 LBS

## 2019-09-27 VITALS — SYSTOLIC BLOOD PRESSURE: 112 MMHG | DIASTOLIC BLOOD PRESSURE: 45 MMHG

## 2019-09-27 VITALS — DIASTOLIC BLOOD PRESSURE: 81 MMHG | SYSTOLIC BLOOD PRESSURE: 121 MMHG

## 2019-09-27 DIAGNOSIS — J44.9: ICD-10-CM

## 2019-09-27 DIAGNOSIS — I10: ICD-10-CM

## 2019-09-27 DIAGNOSIS — F17.203: ICD-10-CM

## 2019-09-27 DIAGNOSIS — A04.72: Primary | ICD-10-CM

## 2019-09-27 LAB
ALBUMIN SERPL-MCNC: 3.7 G/DL (ref 3.4–5)
ALP SERPL-CCNC: 65 U/L (ref 46–116)
ALT SERPL-CCNC: 14 U/L (ref 10–68)
ANION GAP SERPL CALC-SCNC: 14.3 MMOL/L (ref 8–16)
BASOPHILS NFR BLD AUTO: 0.2 % (ref 0–2)
BILIRUB SERPL-MCNC: 0.23 MG/DL (ref 0.2–1.3)
BUN SERPL-MCNC: 18 MG/DL (ref 7–18)
CALCIUM SERPL-MCNC: 8.7 MG/DL (ref 8.5–10.1)
CHLORIDE SERPL-SCNC: 102 MMOL/L (ref 98–107)
CO2 SERPL-SCNC: 27.7 MMOL/L (ref 21–32)
CREAT SERPL-MCNC: 0.9 MG/DL (ref 0.6–1.3)
EOSINOPHIL NFR BLD: 2.8 % (ref 0–7)
ERYTHROCYTE [DISTWIDTH] IN BLOOD BY AUTOMATED COUNT: 13.5 % (ref 11.5–14.5)
GLOBULIN SER-MCNC: 2.6 G/L
GLUCOSE SERPL-MCNC: 97 MG/DL (ref 74–106)
HCT VFR BLD CALC: 37.1 % (ref 36–48)
HGB BLD-MCNC: 12.2 G/DL (ref 12–16)
IMM GRANULOCYTES NFR BLD: 0.2 % (ref 0–5)
LYMPHOCYTES NFR BLD AUTO: 28 % (ref 15–50)
MCH RBC QN AUTO: 28.8 PG (ref 26–34)
MCHC RBC AUTO-ENTMCNC: 32.9 G/DL (ref 31–37)
MCV RBC: 87.7 FL (ref 80–100)
MONOCYTES NFR BLD: 12.8 % (ref 2–11)
NEUTROPHILS NFR BLD AUTO: 56 % (ref 40–80)
OSMOLALITY SERPL CALC.SUM OF ELEC: 280 MOSM/KG (ref 275–300)
PLATELET # BLD: 289 10X3/UL (ref 130–400)
PMV BLD AUTO: 9.5 FL (ref 7.4–10.4)
POTASSIUM SERPL-SCNC: 4 MMOL/L (ref 3.5–5.1)
PROT SERPL-MCNC: 6.3 G/DL (ref 6.4–8.2)
RBC # BLD AUTO: 4.23 10X6/UL (ref 4–5.4)
SODIUM SERPL-SCNC: 140 MMOL/L (ref 136–145)
WBC # BLD AUTO: 9.4 10X3/UL (ref 4.8–10.8)

## 2019-09-27 NOTE — NUR
A&O X 4. AMBULATORY IN ROOM. REPORTS MILD NAUSEA AND ABDOMINAL DISCOMFORT.
LAST BM WAS AROUND NOON, LOOSE WATERY STOOL. PT INFORMED OF NEED FOR STOOL AND
URINE SPECIMEN. ALLERGY BAND APPLIED. DENIES FURTHER NEEDS AT THIS TIME.

## 2019-09-28 VITALS — DIASTOLIC BLOOD PRESSURE: 46 MMHG | SYSTOLIC BLOOD PRESSURE: 118 MMHG

## 2019-09-28 VITALS — SYSTOLIC BLOOD PRESSURE: 132 MMHG | DIASTOLIC BLOOD PRESSURE: 57 MMHG

## 2019-09-28 VITALS — SYSTOLIC BLOOD PRESSURE: 147 MMHG | DIASTOLIC BLOOD PRESSURE: 63 MMHG

## 2019-09-28 VITALS — DIASTOLIC BLOOD PRESSURE: 60 MMHG | SYSTOLIC BLOOD PRESSURE: 126 MMHG

## 2019-09-28 LAB
ALBUMIN SERPL-MCNC: 3 G/DL (ref 3.4–5)
ALP SERPL-CCNC: 56 U/L (ref 46–116)
ALT SERPL-CCNC: 11 U/L (ref 10–68)
ANION GAP SERPL CALC-SCNC: 13.4 MMOL/L (ref 8–16)
APPEARANCE UR: CLEAR
BASOPHILS NFR BLD AUTO: 0.2 % (ref 0–2)
BILIRUB SERPL-MCNC: 0.34 MG/DL (ref 0.2–1.3)
BILIRUB SERPL-MCNC: NEGATIVE MG/DL
BUN SERPL-MCNC: 12 MG/DL (ref 7–18)
CALCIUM SERPL-MCNC: 8 MG/DL (ref 8.5–10.1)
CHLORIDE SERPL-SCNC: 108 MMOL/L (ref 98–107)
CO2 SERPL-SCNC: 23.5 MMOL/L (ref 21–32)
COLOR UR: YELLOW
CREAT SERPL-MCNC: 0.7 MG/DL (ref 0.6–1.3)
EOSINOPHIL NFR BLD: 3.6 % (ref 0–7)
ERYTHROCYTE [DISTWIDTH] IN BLOOD BY AUTOMATED COUNT: 13.7 % (ref 11.5–14.5)
GLOBULIN SER-MCNC: 2.7 G/L
GLUCOSE SERPL-MCNC: 89 MG/DL (ref 74–106)
GLUCOSE SERPL-MCNC: NEGATIVE MG/DL
HCT VFR BLD CALC: 34.6 % (ref 36–48)
HGB BLD-MCNC: 11.1 G/DL (ref 12–16)
IMM GRANULOCYTES NFR BLD: 0.1 % (ref 0–5)
KETONES UR STRIP-MCNC: NEGATIVE MG/DL
LYMPHOCYTES NFR BLD AUTO: 36.3 % (ref 15–50)
MCH RBC QN AUTO: 28.4 PG (ref 26–34)
MCHC RBC AUTO-ENTMCNC: 32.1 G/DL (ref 31–37)
MCV RBC: 88.5 FL (ref 80–100)
MONOCYTES NFR BLD: 13.8 % (ref 2–11)
NEUTROPHILS NFR BLD AUTO: 46 % (ref 40–80)
NITRITE UR-MCNC: NEGATIVE MG/ML
OSMOLALITY SERPL CALC.SUM OF ELEC: 279 MOSM/KG (ref 275–300)
PH UR STRIP: 7 [PH] (ref 5–6)
PLATELET # BLD: 272 10X3/UL (ref 130–400)
PMV BLD AUTO: 10.1 FL (ref 7.4–10.4)
POTASSIUM SERPL-SCNC: 3.9 MMOL/L (ref 3.5–5.1)
PROT SERPL-MCNC: 5.7 G/DL (ref 6.4–8.2)
PROT UR-MCNC: NEGATIVE MG/DL
RBC # BLD AUTO: 3.91 10X6/UL (ref 4–5.4)
SODIUM SERPL-SCNC: 141 MMOL/L (ref 136–145)
SP GR UR STRIP: 1 (ref 1–1.02)
UROBILINOGEN UR-MCNC: NORMAL MG/DL
WBC # BLD AUTO: 8 10X3/UL (ref 4.8–10.8)

## 2019-09-28 NOTE — NUR
REC'D PT LYING IN BED RESP EVEN AND UNLABORED LUNG SOUNDS CLEAR DIMINISHED
THROUGHOUT SKIN PINK WARM AND DRY WITH GOOD TURGOR NO EDEMA AT THIS TIME. IV
TO LEFT WRIST PATENT AND INTACT AT THIS TIME. BED AT LOWEST SETTING WITH
BRAKES ON CALL LIGHT WITHIN REACH WILL CONTINUE TO MONITOR

## 2019-09-28 NOTE — NUR
IV DISCONTINUED WITH CATHETER INTACT AT THIS TIME. PT GIVEN DISCHARGE
INSTRUCTIONS AND ORDERS AT THIS TIME, VERBALIZES UNDERSTANDING AT THIS TIME.
PT TAKEN VIA WHEELCHAIR VIA PRIVATE VEHILCE AT THIS TIME.

## 2019-09-28 NOTE — NUR
CALLED DR MORAES TO CONFIRM RX FOR FLAGYL. HE REPORTS HE INSTRUCTED PT TAKE
IF SHE DEVELOPS DIARRHEA.

## 2019-09-28 NOTE — MORECARE
CASE MANAGEMENT DISCHARGE SUMMARY
 
 
PATIENT: LIZA MEDEL                  UNIT: [id number]
ACCOUNT#: [id number]                       ADM DATE: 19
AGE: 79     : 36  SEX: F            ROOM/BED: D.2231    
AUTHOR: TERRI,DOC                             PHYSICIAN:                               
 
REFERRING PHYSICIAN: ZAY WOLF DO           
DATE OF SERVICE: 19
Discharge Plan
 
 
Patient Name: LIZA MEDEL
Facility: [facility]:[place]
Encounter #: [id number]
Medical Record #: [id number]
: 1940
Planned Disposition: Home
Anticipated Discharge Date: 19
 
Discharge Date: 2019
Expected LOS: 1
Initial Reviewer: [email or web address]
Initial Review Date: 2019
Generated: 19   7:36 pm 
Comments
 
DCP- Discharge Planning
 
Updated by [id number]: Krystin Gomez on 19   5:33 pm CT
LATE ENTRY 1600  
CM MET WITH THE PATIENT AT THE BEDSIDE. BRITTANY , HER SISTER, WAS AT THE 
BEDSIDE.   
CM EXPLINED MY ROLE. REQUEST PERMISSION TO SPEAK W/ HER REGARDING DISCHARGE. 
CM ASK PERMISSION REGARDING HER SISTER'S PRESENCE.  
SHE GAVE PERMISSION FOR SISTER TO STAY DURING ASSESSMENT.   
SHE WILL BE RETURNING TO HER HOME.  
SHE HAS ONE STEP W/ RAILING TO ENTER HER HOME.  
PATIENT IS INDEPENDNT IN HER CARE. HER DAUGHTER, DEBBIE,  LIVES W/ HER.  
SHE STATES SHE DOES NOT REQUIRE ANY ASSISTANCE AS HER DAUGHTER HELPS IF 
NEEDED.  
HER BROTHER IN LAW IS PROVIDING TRANSPORTATION FOR HER AND HIS WIFE , BRITTANY. 
SHE HAS NO ADDITIONAL DME NEEDS. SHE HAS A NEBULIZER, BSC AND WHEELED WALKER. 
SHE DOES NOT DESIRE ANY OUTSIDE SERVICES.   
PCP - DR WOLF  
PHARMACY- DR WOLF  
SHE STATES DR MORAES EXPLAINED "EVERYTHING VERY WELL". SHE HAD NO QUESTIONS. 
DENIED ANY NEEDS OR CONCERNS. SHE WAS ANXIOUS TO GET DRESSED.
 DCPIA - Discharge Planning Initial Assessment
 
 
Updated by [id number]: Krystin Gomez on 19   6:21 pm
*  Is the patient Alert and Oriented?
Yes
*  How many steps to enter\exit or inside your home? 1W/ RAIL *  PCP DR WOLF *  Pharmacy
[facility]
*  Preadmission Environment
Home with Family
*  ADLs
Independent
*  Equipment
Bedside Commode
Nebulizer
Rolling Walker
*  Other Equipment
DENIES ANY ADDITIONAL DME
*  List name and contact numbers for known caregivers / representatives who 
currently or will assist patient after discharge:
DEBBIE MEDEL- DTR- [phone]
*  Verbal permission to speak to the caregivers and representatives has been 
obtained from the patient.
Yes
*  Community resources currently utilized
None
*  Please name any agencies selected above.
N/A
*  Additional services required to return to the preadmission environment?
No
*  Can the patient safely return to the preadmission environment?
Yes
*  Has this patient been hospitalized within the prior 30 days at any 
hospital?
No
 
 
 
 
 
 
 
Last DP export: 19   5:23 p
Patient Name: LIZA MEDEL
 
Encounter #: [id number]
Page [id number]
 
 
 
 
 
Electronically Signed by ARISTEO MURRAY on 19 at 1837
 
 
 
 
 
 
**All edits/amendments must be made on the electronic document**
 
DICTATION DATE: 19     : BATOOL  19     
RPT#: [id number]                                DC DATE:19
                                               STATUS: DIS IN  
[facility]
[address] [place] [address]
***END OF REPORT***

## 2019-09-28 NOTE — MORECARE
CASE MANAGEMENT DISCHARGE SUMMARY
 
 
PATIENT: LIZA MEDEL INGRID                  UNIT: [id number]
ACCOUNT#: [id number]                       ADM DATE: 19
AGE: 79     : 36  SEX: F            ROOM/BED: D.2231    
AUTHOR: ARISTEO MURRAY                             PHYSICIAN:                               
 
REFERRING PHYSICIAN: ZAY WOLF DO           
DATE OF SERVICE: 19
Discharge Plan
 
 
Patient Name: LIZA MEDEL
[facility]
Encounter #: [id number]
Medical Record #: [id number]
: 1940
Planned Disposition: Home
Anticipated Discharge Date: 19
 
Discharge Date: 2019
Expected LOS: 1
Initial Reviewer: [email or web address]
Initial Review Date: 2019
Generated: 19   7:22 pm 
 DCPIA - Discharge Planning Initial Assessment
 
Updated by [id number]: Krystin Gomez on 19   6:21 pm
*  Is the patient Alert and Oriented?
Yes
*  How many steps to enter\exit or inside your home? 1W/ RAIL *  PCP DR WOLF *  Pharmacy
[facility]
*  Preadmission Environment
Home with Family
*  ADLs
Independent
*  Equipment
Bedside Commode
Nebulizer
Rolling Walker
*  Other Equipment
DENIES ANY ADDITIONAL DME
*  List name and contact numbers for known caregivers / representatives who 
currently or will assist patient after discharge:
DEBBIE MEDEL- R- [phone]
*  Verbal permission to speak to the caregivers and representatives has been 
obtained from the patient.
Yes
*  Community resources currently utilized
 
None
*  Please name any agencies selected above.
N/A
*  Additional services required to return to the preadmission environment?
No
*  Can the patient safely return to the preadmission environment?
Yes
*  Has this patient been hospitalized within the prior 30 days at any 
hospital?
No
 
 
 
 
 
 
Patient Name: LIZA MEDEL
Encounter #: [id number]
Page 76037
 
 
 
 
 
Electronically Signed by ARISTEO MURRAY on 19 at 1823
 
 
 
 
 
 
**All edits/amendments must be made on the electronic document**
 
DICTATION DATE: 19     : BATOOL  19     
RPT#: [id number]                                DC DATE:19
                                               STATUS: DIS IN  
[facility]
[address] [place] [address]
***END OF REPORT***

## 2019-09-30 NOTE — MORECARE
CASE MANAGEMENT DISCHARGE SUMMARY
 
 
PATIENT: LIZA MEDEL                  UNIT: [id number]
ACCOUNT#: [id number]                       ADM DATE: 19
AGE: 79     : 36  SEX: F            ROOM/BED: D.2231    
AUTHOR: TERRI,DOC                             PHYSICIAN:                               
 
REFERRING PHYSICIAN: ZAY WOLF DO           
DATE OF SERVICE: 19
Discharge Plan
 
 
Patient Name: LIZA MEDEL
Facility: [facility]
Encounter #: [id number]
Medical Record #: [id number]
: 1940
Planned Disposition: Home
Anticipated Discharge Date: 19
 
Discharge Date: 2019
Expected LOS: 1
Initial Reviewer: [email or web address]
Initial Review Date: 2019
Generated: 19  10:43 am 
Comments
 
DCP- Discharge Planning
 
Updated by [id number]: Krystin Gomez on 19   5:33 pm CT
LATE ENTRY 1600  
CM MET WITH THE PATIENT AT THE BEDSIDE. BRITTANY , HER SISTER, WAS AT THE 
BEDSIDE.   
CM EXPLINED MY ROLE. REQUEST PERMISSION TO SPEAK W/ HER REGARDING DISCHARGE. 
CM ASK PERMISSION REGARDING HER SISTER'S PRESENCE.  
SHE GAVE PERMISSION FOR SISTER TO STAY DURING ASSESSMENT.   
SHE WILL BE RETURNING TO HER HOME.  
SHE HAS ONE STEP W/ RAILING TO ENTER HER HOME.  
PATIENT IS INDEPENDNT IN HER CARE. HER DAUGHTER, DEBBIE,  LIVES W/ HER.  
SHE STATES SHE DOES NOT REQUIRE ANY ASSISTANCE AS HER DAUGHTER HELPS IF 
NEEDED.  
HER BROTHER IN LAW IS PROVIDING TRANSPORTATION FOR HER AND HIS WIFE , BRITTANY. 
SHE HAS NO ADDITIONAL DME NEEDS. SHE HAS A NEBULIZER, BSC AND WHEELED WALKER. 
SHE DOES NOT DESIRE ANY OUTSIDE SERVICES.   
PCP - DR WOLF  
PHARMACY- DR WOLF  
SHE STATES DR MORAES EXPLAINED "EVERYTHING VERY WELL". SHE HAD NO QUESTIONS. 
DENIED ANY NEEDS OR CONCERNS. SHE WAS ANXIOUS TO GET DRESSED.
 DCPIA - Discharge Planning Initial Assessment
 
 
Updated by [email or web address]: Krystin Gomez on 19   6:21 pm
*  Is the patient Alert and Oriented?
Yes
*  How many steps to enter\exit or inside your home? 1W/ RAIL *  PCP DR WOLF *  Pharmacy
[facility]
*  Preadmission Environment
Home with Family
*  ADLs
Independent
*  Equipment
Bedside Commode
Nebulizer
Rolling Walker
*  Other Equipment
DENIES ANY ADDITIONAL DME
*  List name and contact numbers for known caregivers / representatives who 
currently or will assist patient after discharge:
DEBBIE MEDEL- DTR- [phone]
*  Verbal permission to speak to the caregivers and representatives has been 
obtained from the patient.
Yes
*  Community resources currently utilized
None
*  Please name any agencies selected above.
N/A
*  Additional services required to return to the preadmission environment?
No
*  Can the patient safely return to the preadmission environment?
Yes
*  Has this patient been hospitalized within the prior 30 days at any 
hospital?
No
 
 
 
 
 
 
 
Last DP export: 19   5:37 p
Patient Name: LIZA MEDEL
 
Encounter #: [id number]
Page [id number]
 
 
 
 
 
Electronically Signed by ARISTEO MURRAY on 19 at 0943
 
 
 
 
 
 
**All edits/amendments must be made on the electronic document**
 
DICTATION DATE: 19     : BATOOL  19     
RPT#: [id number]                                DC DATE:19
                                               STATUS: DIS IN  
[facility]
[address] [place] [address]
***END OF REPORT***

## 2021-06-02 ENCOUNTER — HOSPITAL ENCOUNTER (OUTPATIENT)
Dept: HOSPITAL 84 - D.M2 | Age: 81
Setting detail: OBSERVATION
LOS: 1 days | Discharge: HOME | End: 2021-06-03
Attending: FAMILY MEDICINE | Admitting: FAMILY MEDICINE
Payer: MEDICARE

## 2021-06-02 VITALS — HEIGHT: 61 IN | BODY MASS INDEX: 20.39 KG/M2 | WEIGHT: 108 LBS | BODY MASS INDEX: 20.39 KG/M2

## 2021-06-02 VITALS — SYSTOLIC BLOOD PRESSURE: 115 MMHG | DIASTOLIC BLOOD PRESSURE: 54 MMHG

## 2021-06-02 VITALS — DIASTOLIC BLOOD PRESSURE: 60 MMHG | SYSTOLIC BLOOD PRESSURE: 111 MMHG

## 2021-06-02 DIAGNOSIS — F17.203: ICD-10-CM

## 2021-06-02 DIAGNOSIS — R63.4: ICD-10-CM

## 2021-06-02 DIAGNOSIS — K52.9: ICD-10-CM

## 2021-06-02 DIAGNOSIS — E43: ICD-10-CM

## 2021-06-02 DIAGNOSIS — I10: ICD-10-CM

## 2021-06-02 DIAGNOSIS — M19.90: ICD-10-CM

## 2021-06-02 DIAGNOSIS — R53.1: ICD-10-CM

## 2021-06-02 DIAGNOSIS — R10.9: Primary | ICD-10-CM

## 2021-06-02 DIAGNOSIS — J44.9: ICD-10-CM

## 2021-06-02 LAB
ALBUMIN SERPL-MCNC: 3.4 G/DL (ref 3.4–5)
ALP SERPL-CCNC: 62 U/L (ref 30–120)
ALT SERPL-CCNC: 16 U/L (ref 10–68)
AMYLASE SERPL-CCNC: 75 U/L (ref 25–115)
ANION GAP SERPL CALC-SCNC: 13.7 MMOL/L (ref 8–16)
BASOPHILS NFR BLD AUTO: 1 % (ref 0–2)
BILIRUB SERPL-MCNC: 0.33 MG/DL (ref 0.2–1.3)
BUN SERPL-MCNC: 11 MG/DL (ref 7–18)
CALCIUM SERPL-MCNC: 8.4 MG/DL (ref 8.5–10.1)
CHLORIDE SERPL-SCNC: 104 MMOL/L (ref 98–107)
CK MB SERPL-MCNC: 0.8 U/L (ref 0–3.6)
CK SERPL-CCNC: 54 UL (ref 21–215)
CO2 SERPL-SCNC: 25.3 MMOL/L (ref 21–32)
CREAT SERPL-MCNC: 0.8 MG/DL (ref 0.6–1.3)
D DIMER PPP FEU-MCNC: 0.68 UG/MLFEU (ref 0.2–0.54)
EOSINOPHIL NFR BLD: 2.5 % (ref 0–7)
ERYTHROCYTE [DISTWIDTH] IN BLOOD BY AUTOMATED COUNT: 13.7 % (ref 11.5–14.5)
GLOBULIN SER-MCNC: 3 G/L
GLUCOSE SERPL-MCNC: 95 MG/DL (ref 74–106)
HCT VFR BLD CALC: 40.6 % (ref 36–48)
HGB BLD-MCNC: 13.2 G/DL (ref 12–16)
INR PPP: 1.06 (ref 0.85–1.17)
LIPASE SERPL-CCNC: 257 U/L (ref 73–393)
LYMPHOCYTES # BLD: 2.5 10X3/UL (ref 1.18–3.74)
LYMPHOCYTES NFR BLD AUTO: 31.7 % (ref 15–50)
MAGNESIUM SERPL-MCNC: 1.9 MG/DL (ref 1.8–2.4)
MCH RBC QN AUTO: 28.4 PG (ref 26–34)
MCHC RBC AUTO-ENTMCNC: 32.5 G/DL (ref 31–37)
MCV RBC: 87.4 FL (ref 80–100)
MONOCYTES NFR BLD: 13.2 % (ref 2–11)
NEUTROPHILS # BLD: 4 10X3/UL (ref 1.56–6.13)
NEUTROPHILS NFR BLD AUTO: 51.6 % (ref 40–80)
OSMOLALITY SERPL CALC.SUM OF ELEC: 276 MOSM/KG (ref 275–300)
PLATELET # BLD: 267 10X3/UL (ref 130–400)
PMV BLD AUTO: 7.9 FL (ref 7.4–10.4)
POTASSIUM SERPL-SCNC: 4 MMOL/L (ref 3.5–5.1)
PROT SERPL-MCNC: 6.4 G/DL (ref 6.4–8.2)
PROTHROMBIN TIME: 12.8 SECONDS (ref 11.6–15)
RBC # BLD AUTO: 4.65 10X6/UL (ref 4–5.4)
SODIUM SERPL-SCNC: 139 MMOL/L (ref 136–145)
WBC # BLD AUTO: 7.8 10X3/UL (ref 4.8–10.8)

## 2021-06-02 NOTE — NUR
PT DIRECT ADMIT FROM DR OFFICE. PT IN ROOM, DAUGHTER AT BEDSIDE, 20 GAUGE
SALINE LOCK STARTED TO LEFT FOREARM AT THIS TIME, CL IN REACH, SR UP X 2.

## 2021-06-03 VITALS — DIASTOLIC BLOOD PRESSURE: 74 MMHG | SYSTOLIC BLOOD PRESSURE: 115 MMHG

## 2021-06-03 VITALS — DIASTOLIC BLOOD PRESSURE: 68 MMHG | SYSTOLIC BLOOD PRESSURE: 114 MMHG

## 2021-06-03 VITALS — DIASTOLIC BLOOD PRESSURE: 77 MMHG | SYSTOLIC BLOOD PRESSURE: 161 MMHG

## 2021-06-03 LAB
ALBUMIN SERPL-MCNC: 3.4 G/DL (ref 3.4–5)
ALP SERPL-CCNC: 60 U/L (ref 30–120)
ALT SERPL-CCNC: 13 U/L (ref 10–68)
ANION GAP SERPL CALC-SCNC: 12.7 MMOL/L (ref 8–16)
BASOPHILS NFR BLD AUTO: 0.4 % (ref 0–2)
BILIRUB SERPL-MCNC: 0.43 MG/DL (ref 0.2–1.3)
BUN SERPL-MCNC: 10 MG/DL (ref 7–18)
CALCIUM SERPL-MCNC: 8.3 MG/DL (ref 8.5–10.1)
CHLORIDE SERPL-SCNC: 107 MMOL/L (ref 98–107)
CO2 SERPL-SCNC: 25 MMOL/L (ref 21–32)
CREAT SERPL-MCNC: 0.7 MG/DL (ref 0.6–1.3)
EOSINOPHIL NFR BLD: 3.3 % (ref 0–7)
ERYTHROCYTE [DISTWIDTH] IN BLOOD BY AUTOMATED COUNT: 13.5 % (ref 11.5–14.5)
GLOBULIN SER-MCNC: 3 G/L
GLUCOSE SERPL-MCNC: 102 MG/DL (ref 74–106)
HCT VFR BLD CALC: 41.9 % (ref 36–48)
HGB BLD-MCNC: 13.6 G/DL (ref 12–16)
LYMPHOCYTES # BLD: 2.2 10X3/UL (ref 1.18–3.74)
LYMPHOCYTES NFR BLD AUTO: 31.9 % (ref 15–50)
MAGNESIUM SERPL-MCNC: 2 MG/DL (ref 1.8–2.4)
MCH RBC QN AUTO: 28.5 PG (ref 26–34)
MCHC RBC AUTO-ENTMCNC: 32.4 G/DL (ref 31–37)
MCV RBC: 88 FL (ref 80–100)
MONOCYTES NFR BLD: 13.1 % (ref 2–11)
NEUTROPHILS # BLD: 3.5 10X3/UL (ref 1.56–6.13)
NEUTROPHILS NFR BLD AUTO: 51.3 % (ref 40–80)
OSMOLALITY SERPL CALC.SUM OF ELEC: 279 MOSM/KG (ref 275–300)
PLATELET # BLD: 268 10X3/UL (ref 130–400)
PMV BLD AUTO: 8.2 FL (ref 7.4–10.4)
POTASSIUM SERPL-SCNC: 3.7 MMOL/L (ref 3.5–5.1)
PROT SERPL-MCNC: 6.4 G/DL (ref 6.4–8.2)
RBC # BLD AUTO: 4.76 10X6/UL (ref 4–5.4)
SODIUM SERPL-SCNC: 141 MMOL/L (ref 136–145)
WBC # BLD AUTO: 6.8 10X3/UL (ref 4.8–10.8)

## 2021-06-03 NOTE — NUR
PATIENT RESTING COMFORTABLY IN BED.  NO CURRENT PAIN OR DISTRESS VOICED.  RESP
EVEN AND UNLABORED.  IV PATENT TO LEFT FOREARM.

## 2021-06-03 NOTE — NUR
PATIENT AWAKE AND ALERT.  NO CURRENT PAIN OR DISTRESS NOTED.   MEDS TAKEN WITH
NO ISSUE.  LUNG SOUNDS CTA.  RESP EVEN AND UNLABORED. ON ROOM AIR SATS 99.
BOWEL SOUNDS ACTIVE.   HEART SOUNDS REGULAR RATE AND RYTHYM.   ABLE TO MOVE
AROUND ROOM WITHOUT ASSIST PER PT.

## 2021-06-03 NOTE — NUR
REHAB PRESCREENING
Rehab referral received and chart reviewed.  This patient has both PT and OT
evaluations ordered and a pending GI consult.  Rehab will continue to follow
for admission criteria.
Thank you for this referral!
Mariely Carson, [facility] PD